# Patient Record
Sex: MALE
[De-identification: names, ages, dates, MRNs, and addresses within clinical notes are randomized per-mention and may not be internally consistent; named-entity substitution may affect disease eponyms.]

---

## 2017-03-15 ENCOUNTER — HOSPITAL ENCOUNTER (OUTPATIENT)
Dept: HOSPITAL 42 - ENDO | Age: 77
Discharge: HOME | End: 2017-03-15
Attending: SPECIALIST
Payer: MEDICARE

## 2017-03-15 VITALS
HEART RATE: 55 BPM | OXYGEN SATURATION: 95 % | RESPIRATION RATE: 21 BRPM | DIASTOLIC BLOOD PRESSURE: 63 MMHG | TEMPERATURE: 97.5 F | SYSTOLIC BLOOD PRESSURE: 131 MMHG

## 2017-03-15 VITALS — BODY MASS INDEX: 29.6 KG/M2

## 2017-03-15 DIAGNOSIS — K44.9: ICD-10-CM

## 2017-03-15 DIAGNOSIS — K21.0: Primary | ICD-10-CM

## 2017-03-15 DIAGNOSIS — K31.7: ICD-10-CM

## 2017-03-15 DIAGNOSIS — K64.8: ICD-10-CM

## 2017-03-15 DIAGNOSIS — K63.5: ICD-10-CM

## 2017-03-15 DIAGNOSIS — K57.30: ICD-10-CM

## 2017-03-15 DIAGNOSIS — K29.50: ICD-10-CM

## 2017-03-15 DIAGNOSIS — E78.5: ICD-10-CM

## 2017-03-15 DIAGNOSIS — Z12.11: ICD-10-CM

## 2017-03-15 PROCEDURE — 45380 COLONOSCOPY AND BIOPSY: CPT

## 2017-03-15 PROCEDURE — 43239 EGD BIOPSY SINGLE/MULTIPLE: CPT

## 2017-03-15 PROCEDURE — 88312 SPECIAL STAINS GROUP 1: CPT

## 2017-03-15 PROCEDURE — 88305 TISSUE EXAM BY PATHOLOGIST: CPT

## 2017-03-15 PROCEDURE — 88342 IMHCHEM/IMCYTCHM 1ST ANTB: CPT

## 2018-09-19 ENCOUNTER — HOSPITAL ENCOUNTER (INPATIENT)
Dept: HOSPITAL 42 - ED | Age: 78
LOS: 8 days | Discharge: HOME | DRG: 418 | End: 2018-09-27
Attending: FAMILY MEDICINE | Admitting: INTERNAL MEDICINE
Payer: MEDICARE

## 2018-09-19 VITALS — BODY MASS INDEX: 29.6 KG/M2

## 2018-09-19 DIAGNOSIS — R78.81: ICD-10-CM

## 2018-09-19 DIAGNOSIS — E78.00: ICD-10-CM

## 2018-09-19 DIAGNOSIS — B95.4: ICD-10-CM

## 2018-09-19 DIAGNOSIS — K22.70: ICD-10-CM

## 2018-09-19 DIAGNOSIS — I35.9: ICD-10-CM

## 2018-09-19 DIAGNOSIS — I25.10: ICD-10-CM

## 2018-09-19 DIAGNOSIS — K66.0: ICD-10-CM

## 2018-09-19 DIAGNOSIS — K80.10: ICD-10-CM

## 2018-09-19 DIAGNOSIS — Z79.82: ICD-10-CM

## 2018-09-19 DIAGNOSIS — Z79.899: ICD-10-CM

## 2018-09-19 DIAGNOSIS — K40.90: ICD-10-CM

## 2018-09-19 DIAGNOSIS — I10: ICD-10-CM

## 2018-09-19 DIAGNOSIS — E78.5: ICD-10-CM

## 2018-09-19 DIAGNOSIS — K21.9: ICD-10-CM

## 2018-09-19 DIAGNOSIS — K80.62: Primary | ICD-10-CM

## 2018-09-19 DIAGNOSIS — Z95.3: ICD-10-CM

## 2018-09-19 DIAGNOSIS — Z87.891: ICD-10-CM

## 2018-09-19 LAB
ALBUMIN SERPL-MCNC: 4.3 G/DL (ref 3–4.8)
ALBUMIN/GLOB SERPL: 1.2 {RATIO} (ref 1.1–1.8)
ALT SERPL-CCNC: 499 U/L (ref 7–56)
APTT BLD: 31.2 SECONDS (ref 25.1–36.5)
AST SERPL-CCNC: 691 U/L (ref 17–59)
BUN SERPL-MCNC: 20 MG/DL (ref 7–21)
CALCIUM SERPL-MCNC: 9.6 MG/DL (ref 8.4–10.5)
ERYTHROCYTE [DISTWIDTH] IN BLOOD BY AUTOMATED COUNT: 13.6 % (ref 11.5–14.5)
GFR NON-AFRICAN AMERICAN: > 60
HGB BLD-MCNC: 13.5 G/DL (ref 14–18)
INR PPP: 1.09
LIPASE SERPL-CCNC: 159 U/L (ref 23–300)
MCH RBC QN AUTO: 28.8 PG (ref 25–35)
MCHC RBC AUTO-ENTMCNC: 33.4 G/DL (ref 31–37)
MCV RBC AUTO: 86.1 FL (ref 80–105)
PLATELET # BLD: 144 10^3/UL (ref 120–450)
PMV BLD AUTO: 11 FL (ref 7–11)
PROTHROMBIN TIME: 12.4 SECONDS (ref 9.4–12.5)
RBC # BLD AUTO: 4.69 10^6/UL (ref 3.5–6.1)
TROPONIN I SERPL-MCNC: < 0.01 NG/ML
WBC # BLD AUTO: 6.5 10^3/UL (ref 4.5–11)

## 2018-09-19 NOTE — ED PDOC
Arrival/HPI





- General


Chief Complaint: Abdominal Pain


Time Seen by Provider: 09/19/18 21:23


Historian: Patient





- History of Present Illness


Narrative History of Present Illness (Text): 


09/19/18 21:41


Yaniv Goldman is a 78 year old male, whose past medical history includes 

hyperlipidemia, GERD, status post valve replacement, who presents to the 

Emergency department complaining of abdominal pain. Patient reports he has been 

experiencing diffuse abdominal pain for the past week, notes pain began after 

taking his cholesterol medication. Patient denies any changes in medication or 

changes in dosage. Patient notes pain is worsened after eating and reports 

associated nausea. Patient denies any fever, chills, chest pain, shortness of 

breath, vomiting, diarrhea, urinary symptoms, back pain, neck pain, headache, 

dizziness, or any other complaints. 





PMD: Dr. Quick





Past Medical History





- Provider Review


Nursing Documentation Reviewed: Yes





- Infectious Disease


Hx of Infectious Diseases: None





- Tetanus Immunization


Tetanus Immunization: Unknown





- Cardiac


Hx Hypertension: Yes





- Neurological


Hx Paralysis: No





- Hematological/Oncological


Hx Blood Transfusions: No





- Musculoskeletal/Rheumatological


Hx Musculoskeletal Disorders: No





- Gastrointestinal


Hx Gastroesophageal Reflux: Yes





- Psychiatric


Hx Emotional Abuse: No


Hx Physical Abuse: No


Hx Substance Use: No





- Surgical History


Hx Valve Replacement: Yes





- Anesthesia


Hx Anesthesia Reactions: No


Hx Malignant Hyperthermia: No





- Suicidal Assessment


Feels Threatened In Home Enviroment: No





Family/Social History





- Physician Review


Nursing Documentation Reviewed: Yes


Family/Social History: Unknown Family HX


Smoking Status: Never Smoked


Hx Alcohol Use: Yes (OCCASIONAL)


Hx Substance Use: No


Hx Substance Use Treatment: No





Allergies/Home Meds


Allergies/Adverse Reactions: 


Allergies





No Known Allergies Allergy (Verified 07/18/15 21:22)


 








Home Medications: 


 Home Meds











 Medication  Instructions  Recorded  Confirmed


 


Fenofibrate Nanocrystallized 48 mg PO DAILY 04/21/16 04/23/18





[Fenofibrate]   


 


Pravastatin Sodium [Pravachol] 40 mg PO DAILY 04/21/16 04/23/18


 


Aspirin [Ecotrin] 81 mg PO DAILY 03/07/17 04/23/18


 


Omeprazole 40 mg PO DAILY 03/15/17 04/23/18














Physical Exam


Vital Signs Reviewed: Yes


Vital Signs











  Temp Pulse Resp BP Pulse Ox


 


 09/20/18 03:00  98.2 F  63  20  175/90 H  95


 


 09/20/18 00:44  98.2 F  67  20  128/67  97


 


 09/19/18 21:29  98.4 F  55 L  18  171/91 H  97











Temperature: Afebrile


Blood Pressure: Hypertensive


Pulse: Regular


Respiratory Rate: Normal


Appearance: Positive for: Well-Appearing, Non-Toxic, Comfortable


Pain Distress: None


Mental Status: Positive for: Alert and Oriented X 3





- Systems Exam


Head: Present: Atraumatic, Normocephalic


Pupils: Present: PERRL


Extroacular Muscles: Present: EOMI


Conjunctiva: Present: Normal


Mouth: Present: Moist Mucous Membranes


Neck: Present: Normal Range of Motion


Respiratory/Chest: Present: Clear to Auscultation, Good Air Exchange.  No: 

Respiratory Distress, Accessory Muscle Use


Cardiovascular: Present: Regular Rate and Rhythm, Normal S1, S2.  No: Murmurs


Abdomen: Present: Tenderness (right upper abdomen), Distention (Mild abdominal 

distension), Normal Bowel Sounds.  No: Peritoneal Signs, Rebound, Guarding, 

Mc[place]'s Point Tender


Back: Present: Normal Inspection


Upper Extremity: Present: Normal Inspection.  No: Cyanosis, Edema


Lower Extremity: Present: Normal Inspection.  No: Edema


Neurological: Present: GCS=15, CN II-XII Intact, Speech Normal


Skin: Present: Warm, Dry, Normal Color.  No: Rashes


Psychiatric: Present: Alert, Oriented x 3, Normal Insight, Normal Concentration





Medical Decision Making


ED Course and Treatment: 


09/19/18 21:41


Impression: 


78 year old male c/o diffuse abdominal pain and nausea x 1 week.





Plan: 


-- CT Abdomen and Pelvis


-- EKG


-- Chest X-Ray


-- Labs, lipase


-- UA


-- reassess and disposition





Prior Visits: 


Notes and results from pervious visits were reviewed. 








Progress Notes: 


Reviewed EKG, NSR at 60 bpm. 1st degree AV block. No acute changes.





09/19/18 23:43


Chest X-Ray reviewed, shows no acute processes.


Labs noted, AST: 691, ALT: 499, bilirubin: 2.1. US Gallbladder and Pancreas 

ordered. 


RN reports patient is complaining of abdominal pain, Morphine, Zofran, and IV 

fluids ordered.





09/20/18 01:31


Preliminary US read shows multiple gallstones and thickened gallbladder wall. 

Rocephin and Flagyl ordered.





09/20/18 01:42


Case discussed with medical resident on call, who is aware and agrees with plan.





09/20/18 01:44


Case discussed with Dr. ANETA Brothers, who is aware and agrees with plan. Accepts pt 

in to hospitalist service. Pt will be admitted to [facility] for cholecystitis. 





09/20/18 02:40


CT Abdomen and Pelvis shows:


Lower thorax: Right pleural plaques.


ABDOMEN:


Liver: Heterogenous in appearance.


Gallbladder and bile ducts: Mild intrahepatic biliary ductal dilatation. 

Gallstones.


Pancreas: Pancreas evaluation is limited.


Spleen: Normal. No splenomegaly.


Adrenals: thickening of the left adrenal gland.


Kidneys and ureters: Subcentimeter low-density lesions in the kidneys are too 

small .


Stomach and bowel: Bowel evaluation is limited. There is wall thickening noted 

involving the


sigmoid colon with slight infiltration of fat. Question early diverticulitis. 

Consider followup to evaluate


the underlying wall. The wall thickening noted involving the descending colon 

as well.


Underdistended.


Appendix: Right inguinal hernia containing part of the appendix.


PELVIS:


Bladder: Unremarkable as visualized.


Reproductive: The prostate is enlarged.


ABDOMEN and PELVIS:


Intraperitoneal space: Normal. No free air. No significant fluid collection.


Bones/joints: No acute fracture. No dislocation.


Soft tissues: Bilateral small inguinal hernia containing fat.


Vasculature: Arthrosclerotic changes of the aorta. Atherosclerotic changes of 

the aorta. Infrarenal


dilatation of the abdominal aorta.


Lymph nodes: Normal. No enlarged lymph nodes.


IMPRESSION:


1. Mild intrahepatic biliary ductal dilatation.


2. Gallstones.


3. Right inguinal hernia containing part of the appendix.


4. There is wall thickening noted involving the sigmoid colon with slight 

infiltration of fat. Question


early diverticulitis. Consider followup to evaluate the underlying wall.


Dictated and Authenticated by: Amira Menard MD


09/20/2018 2:01 AM Eastern Time ([place] & [place])





- Lab Interpretations


Lab Results: 








 09/19/18 22:00 





 09/19/18 22:00 





 Lab Results





09/19/18 22:00: PT 12.4, INR 1.09, APTT 31.2


09/19/18 22:00: WBC 6.5, RBC 4.69, Hgb 13.5 L, Hct 40.4 L, MCV 86.1, MCH 28.8, 

MCHC 33.4, RDW 13.6, Plt Count 144, MPV 11.0


09/19/18 22:00: Sodium 140, Potassium 4.6, Chloride 102, Carbon Dioxide 30, 

Anion Gap 13, BUN 20, Creatinine 0.8, Est GFR (African Amer) > 60, Est GFR (Non-

Af Amer) > 60, Random Glucose 167 H, Calcium 9.6, Total Bilirubin 2.1 H, AST 

691 H D,  H, Alkaline Phosphatase 340 H D, Lactate Dehydrogenase 1652 H, 

Total Creatine Kinase 55, Troponin I < 0.01  D, Total Protein 7.8, Albumin 4.3, 

Globulin 3.5, Albumin/Globulin Ratio 1.2, Lipase 159








I have reviewed the lab results: Yes





- RAD Interpretation


Radiology Orders: 








09/19/18


GALLBLADDER & PANCREAS [US] Stat 





09/19/18 21:41


CHEST PORTABLE [RAD] Stat 





09/19/18 21:42


ABD & PELVIS IV CONTRAST ONLY [CT] Stat 











: ED Physician, Radiologist





- EKG Interpretation


Interpreted by ED Physician: Yes


Type: 12 lead EKG





- Medication Orders


Current Medication Orders: 








Metronidazole (Flagyl)  500 mg in 100 mls @ 100 mls/hr IVPB Q8 JOSE CARLOS


   PRN Reason: Protocol


Ceftriaxone Sodium (Rocephin 1 Gram Ivpb)  1 gm in 100 mls @ 100 mls/hr IVPB 

DAILY JOSE CARLOS


   PRN Reason: Protocol


Sodium Chloride (Sodium Chloride 0.9%)  1,000 mls @ 75 mls/hr IV .[id number] JOSE CARLOS


   Last Admin: 09/20/18 04:26  Dose: 75 mls/hr





eMAR Start Stop


 Document     09/20/18 04:26  BR  (Rec: 09/20/18 04:26  BR  [id number])


     Intravenous Solution


      Start Date                                 09/20/18


      Start Time                                 04:26





Ondansetron HCl (Zofran Inj)  4 mg IVP Q6H PRN


   PRN Reason: Nausea/Vomiting


Pantoprazole Sodium (Protonix Inj)  40 mg IVP DAILY JOSE CARLOS





Discontinued Medications





Sodium Chloride (Sodium Chloride 0.9%)  500 mls @ 500 mls/hr IV .Q1H STA


   Stop: 09/20/18 00:40


   Last Admin: 09/20/18 00:00  Dose: 500 mls/hr





eMAR Start Stop


 Document     09/20/18 00:00  OCS  (Rec: 09/20/18 00:00  OCS  [id number])


     Intravenous Solution


      Start Date                                 09/20/18


      Start Time                                 00:00


      End Date                                   09/20/18


      End time                                   01:00


      Total Infusion Time                        60





Ceftriaxone Sodium (Rocephin 1 Gram Ivpb)  1 gm in 100 mls @ 200 mls/hr IV ONCE 

STA


   PRN Reason: Protocol


   Stop: 09/20/18 02:05


   Last Admin: 09/20/18 02:11  Dose: 200 mls/hr





eMAR Start Stop


 Document     09/20/18 02:11  OCS  (Rec: 09/20/18 02:12  OCS  [id number])


     Intravenous Solution


      Start Date                                 09/20/18


      Start Time                                 02:12


      End Date                                   09/20/18


      End time                                   02:42


      Total Infusion Time                        30





Metronidazole (Flagyl)  500 mg in 100 mls @ 100 mls/hr IVPB STAT STA


   PRN Reason: Protocol


   Stop: 09/20/18 02:35


   Last Admin: 09/20/18 03:27  Dose: 100 mls/hr





eMAR Start Stop


 Document     09/20/18 03:27  OCS  (Rec: 09/20/18 03:27  OCS  [id number])


     Intravenous Solution


      Start Date                                 09/20/18


      Start Time                                 03:27





Morphine Sulfate (Morphine)  2 mg IVP STAT STA


   Stop: 09/19/18 23:42


   Last Admin: 09/19/18 23:59  Dose: 2 mg





MAR Pain Assessment


 Document     09/19/18 23:59  OCS  (Rec: 09/19/18 23:59  OCS  [id number])


     Pain Reassessment


      Is this a pain reassessment?               No


     Sleep


      Is patient sleeping during reassessment?   No


     Presence of Pain


      Presence of Pain                           Yes


     Pain Scale Used


      Pain Scale Used                            Numeric


     Location


      Upper or Lower                             Upper


      Pain Location Body Site                    Abdomen


     Description


      Description                                Constant


      Intensity of Pain at present               10


      Pain Behavior                              Guarding


                                                 Irritability


                                                 Facial Grimacing


      Aggravating Factors                        ADL's


IVP Administration


 Document     09/19/18 23:59  OCS  (Rec: 09/19/18 23:59  OCS  [id number])


     Charges for Administration


      # of IVP Administrations                   1





Morphine Sulfate (Morphine)  1 mg IVP STAT STA


   Stop: 09/20/18 03:57


   Last Admin: 09/20/18 04:23  Dose: 1 mg





MAR Pain Assessment


 Document     09/20/18 04:23  BR  (Rec: 09/20/18 04:24  BR  [id number])


     Pain Reassessment


      Is this a pain reassessment?               No


     Sleep


      Is patient sleeping during reassessment?   No


     Presence of Pain


      Presence of Pain                           Yes


IVP Administration


 Document     09/20/18 04:23  BR  (Rec: 09/20/18 04:24  BR  [id number])


     Charges for Administration


      # of IVP Administrations                   1





Ondansetron HCl (Zofran Inj)  4 mg IVP ONCE ONE


   Stop: 09/19/18 23:42


   Last Admin: 09/19/18 23:59  Dose: 4 mg





IVP Administration


 Document     09/19/18 23:59  OCS  (Rec: 09/19/18 23:59  OCS  [id number])


     Charges for Administration


      # of IVP Administrations                   1














- PA / NP / Resident Statement


MD/DO has reviewed & agrees with the documentation as recorded.


MD/DO has examined the patient and agrees with the treatment plan.





- Scribe Statement


The provider has reviewed the documentation as recorded by the Scribesther Lepe





All medical record entries made by the Cyrusibesther were at my direction and 

personally dictated by me. I have reviewed the chart and agree that the record 

accurately reflects my personal performance of the history, physical exam, 

medical decision making, and the department course for this patient. I have 

also personally directed, reviewed, and agree with the discharge instructions 

and disposition.








Disposition/Present on Arrival





- Present on Arrival


Any Indicators Present on Arrival: No


History of DVT/PE: No


History of Uncontrolled Diabetes: No


Urinary Catheter: No


History of Decub. Ulcer: No


History Surgical Site Infection Following: None





- Disposition


Have Diagnosis and Disposition been Completed?: Yes


Diagnosis: 


 Biliary colic, Cholecystitis, Elevated transaminase level





Disposition: HOSPITALIZED


Disposition Time: 01:44


Patient Plan: Admission


Patient Problems: 


 Current Active Problems











Problem Status Onset


 


Biliary colic Acute  


 


Cholecystitis Acute  


 


Elevated transaminase level Acute  











Condition: STABLE

## 2018-09-20 LAB
ALBUMIN SERPL-MCNC: 3.9 G/DL (ref 3–4.8)
ALBUMIN/GLOB SERPL: 1.2 {RATIO} (ref 1.1–1.8)
ALT SERPL-CCNC: 690 U/L (ref 7–56)
APPEARANCE UR: (no result)
AST SERPL-CCNC: 750 U/L (ref 17–59)
BASOPHILS # BLD AUTO: 0.01 K/MM3 (ref 0–2)
BASOPHILS NFR BLD: 0.1 % (ref 0–3)
BILIRUB DIRECT SERPL-MCNC: 2.4 MG/DL (ref 0–0.4)
BILIRUB UR-MCNC: (no result) MG/DL
BUN SERPL-MCNC: 20 MG/DL (ref 7–21)
CALCIUM SERPL-MCNC: 8.8 MG/DL (ref 8.4–10.5)
COLOR UR: YELLOW
EOSINOPHIL # BLD: 0 10*3/UL (ref 0–0.7)
EOSINOPHIL NFR BLD: 0 % (ref 1.5–5)
EPI CELLS #/AREA URNS HPF: (no result) /HPF (ref 0–5)
ERYTHROCYTE [DISTWIDTH] IN BLOOD BY AUTOMATED COUNT: 13.9 % (ref 11.5–14.5)
GAMMA GLUTAMYL TRANSPEPTIDASE: 1146 U/L (ref 8–78)
GFR NON-AFRICAN AMERICAN: > 60
GLUCOSE UR STRIP-MCNC: NEGATIVE MG/DL
GRANULOCYTES # BLD: 8.21 10*3/UL (ref 1.4–6.5)
GRANULOCYTES NFR BLD: 85.8 % (ref 50–68)
HEPATITIS A IGM: NEGATIVE
HEPATITIS B CORE AB: NEGATIVE
HEPATITIS C ANTIBODY: NEGATIVE
HGB BLD-MCNC: 12.6 G/DL (ref 14–18)
LEUKOCYTE ESTERASE UR-ACNC: NEGATIVE LEU/UL
LYMPHOCYTES # BLD: 0.8 10*3/UL (ref 1.2–3.4)
LYMPHOCYTES NFR BLD AUTO: 8.8 % (ref 22–35)
MCH RBC QN AUTO: 28.1 PG (ref 25–35)
MCHC RBC AUTO-ENTMCNC: 32.6 G/DL (ref 31–37)
MCV RBC AUTO: 86.4 FL (ref 80–105)
MONOCYTES # BLD AUTO: 0.5 10*3/UL (ref 0.1–0.6)
MONOCYTES NFR BLD: 5.3 % (ref 1–6)
PH UR STRIP: 7.5 [PH] (ref 4.7–8)
PLATELET # BLD: 136 10^3/UL (ref 120–450)
PMV BLD AUTO: 11.2 FL (ref 7–11)
PROT UR STRIP-MCNC: (no result) MG/DL
RBC # BLD AUTO: 4.48 10^6/UL (ref 3.5–6.1)
RBC # UR STRIP: (no result) /UL
RBC #/AREA URNS HPF: (no result) /HPF (ref 0–2)
SP GR UR STRIP: 1.01 (ref 1–1.03)
UROBILINOGEN UR STRIP-ACNC: 1 E.U./DL
WBC # BLD AUTO: 9.6 10^3/UL (ref 4.5–11)
WBC #/AREA URNS HPF: (no result) /HPF (ref 0–6)

## 2018-09-20 RX ADMIN — PIPERACILLIN AND TAZOBACTAM SCH MLS/HR: 3; .375 INJECTION, POWDER, LYOPHILIZED, FOR SOLUTION INTRAVENOUS; PARENTERAL at 17:38

## 2018-09-20 RX ADMIN — PIPERACILLIN AND TAZOBACTAM SCH MLS/HR: 3; .375 INJECTION, POWDER, LYOPHILIZED, FOR SOLUTION INTRAVENOUS; PARENTERAL at 23:37

## 2018-09-20 RX ADMIN — PIPERACILLIN AND TAZOBACTAM SCH MLS/HR: 3; .375 INJECTION, POWDER, LYOPHILIZED, FOR SOLUTION INTRAVENOUS; PARENTERAL at 11:09

## 2018-09-20 NOTE — CP.PCM.CON
<Tramaine Wu - Last Filed: 09/20/18 12:29>





History of Present Illness





- History of Present Illness


History of Present Illness: 


GI Consult Note for Dr. Venegas's Service - [place] PGY2





Reason for Consult: Evaluation for EUS/ERCP





Mr. Goldman is a 78 year old male with a past medical history significant for 

GERD, HTN, HLD, AS s/p bioprosthetic AVR, inguinal hernia s/p LIHR who 

presented with RUQ pain for one week. GI was consulted to perform EUS/ERCP. 

Patient reports that he began to have the pain after taking his cholesterol 

medication. This pain was noted to be intermittent and would resolve throughout 

the course of the day but would return when that patient would take his 

medications. Patient was hoping this would resolve on its own but yesterday the 

pain persisted beyond its normal short self resolving episodes so he decided to 

come in for further evaluation. He describes the pain as sharp, non-radiating 

and a 10/10 prior to presentation (currently 5/10). Currently, he denies illness

, recent travel, sick contacts, weight loss, night sweats, fevers, chills, 

headache, lightheadedness, changes in vision, chest pain, SOB, abdominal pain, N

/V/D/C, melena, hematemesis, changes in urine output, skin changes or any 

numbness/tingling/weakness of any extremity.





PMH: GERD, HTN, HLD, AS s/p bioprosthetic AVR, inguinal hernia s/p LIHR


PSH:  Bioprosthetic AVR (2009), LIHR (~1980)


Family History: Reviewed and non-contributory


Social History: Former smoker; Denies any alcohol or illicit drug use


Allergies: NKDA


Home Medications: As per MAR





PMD: Dr. Quick





Review of Systems





- Review of Systems


Review of Systems: 


As stated in HPI, otherwise negative





Past Patient History





- Infectious Disease


Hx of Infectious Diseases: None





- Tetanus Immunizations


Tetanus Immunization: Unknown





- Past Social History


Smoking Status: Never Smoked





- CARDIAC


Hx Heart Murmur: No


Hx Hypercholesterolemia: Yes


Hx Hypertension: Yes


Hx Pacemaker: No


Other/Comment: Valve replacement (2005)





- PULMONARY


Hx Respiratory Disorders: No





- NEUROLOGICAL


Hx Neurological Disorder: No





- HEENT


Hx HEENT Problems: No





- RENAL


Hx Chronic Kidney Disease: No





- ENDOCRINE/METABOLIC


Hx Endocrine Disorders: No





- HEMATOLOGICAL/ONCOLOGICAL


Hx Blood Disorders: No





- INTEGUMENTARY


Hx Dermatological Problems: No





- MUSCULOSKELETAL/RHEUMATOLOGICAL


Hx Falls: No





- GASTROINTESTINAL


Hx Gastroesophageal Reflux: Yes





- GENITOURINARY/GYNECOLOGICAL


Hx Genitourinary Disorders: No





- PSYCHIATRIC


Hx Emotional Abuse: No


Hx Physical Abuse: No


Hx Substance Use: No





- SURGICAL HISTORY


Hx Valve Replacement: Yes





- ANESTHESIA


Hx Anesthesia Reactions: No


Hx Malignant Hyperthermia: No





Meds


Allergies/Adverse Reactions: 


 Allergies











Allergy/AdvReac Type Severity Reaction Status Date / Time


 


No Known Allergies Allergy   Verified 07/18/15 21:22














- Medications


Medications: 


 Current Medications





Atorvastatin Calcium (Lipitor)  20 mg PO DIN JOSE CARLOS


Sodium Chloride (Sodium Chloride 0.9%)  1,000 mls @ 75 mls/hr IV .[id number] [facility]


   Last Admin: 09/20/18 04:26 Dose:  75 mls/hr


Piperacillin Sod/Tazobactam Sod (Zosyn 3.375 In Ns 100ml)  100 mls @ 200 mls/hr 

IVPB Q6 JOSE CARLOS


   PRN Reason: Protocol


   Stop: 09/27/18 12:01


   Last Admin: 09/20/18 11:09 Dose:  200 mls/hr


Ondansetron HCl (Zofran Inj)  4 mg IVP Q6H PRN


   PRN Reason: Nausea/Vomiting


   Last Admin: 09/20/18 07:22 Dose:  4 mg


Pantoprazole Sodium (Protonix Inj)  40 mg IVP DAILY [facility]


   Last Admin: 09/20/18 10:06 Dose:  40 mg











Physical Exam





- Constitutional


Appears: Non-toxic, In Acute Distress





- Head Exam


Head Exam: ATRAUMATIC, NORMOCEPHALIC





- Eye Exam


Eye Exam: EOMI





- Neck Exam


Neck exam: Positive for: Full Rom





- Respiratory Exam


Respiratory Exam: NORMAL BREATHING PATTERN.  absent: Accessory Muscle Use, 

Respiratory Distress





- Cardiovascular Exam


Cardiovascular Exam: +S1, +S2





- GI/Abdominal Exam


GI & Abdominal Exam: Normal Bowel Sounds, Soft, Tenderness (RUQ TTP; Mild).  

absent: Bruit, Diminished Bowel Sounds, Distended, Firm, Guarding, Hernia, 

Hyperactive Bowel Sounds, Hypoactive Bowel Sounds, Mass, Organomegaly, 

Pulsatile Mass, Rebound, Rigid





- Rectal Exam


Rectal Exam: Deferred





- Neurological Exam


Neurological exam: Alert, Oriented x3





- Psychiatric Exam


Psychiatric exam: Normal Affect, Normal Mood





- Skin


Skin Exam: Dry, Intact, Normal Color, Warm





Results





- Vital Signs


Recent Vital Signs: 


 Last Vital Signs











Temp  98.8 F   09/20/18 07:30


 


Pulse  74   09/20/18 07:30


 


Resp  20   09/20/18 08:24


 


BP  152/81 H  09/20/18 07:30


 


Pulse Ox  95   09/20/18 07:30














- Labs


Result Diagrams: 


 09/20/18 07:00





 09/20/18 07:00


Labs: 


 Laboratory Results - last 24 hr











  09/20/18 09/20/18 09/20/18





  03:00 07:00 07:00


 


WBC   9.6  D 


 


RBC   4.48 


 


Hgb   12.6 L 


 


Hct   38.7 L 


 


MCV   86.4 


 


MCH   28.1 


 


MCHC   32.6 


 


RDW   13.9 


 


Plt Count   136 


 


MPV   11.2 H 


 


Gran %   85.8 H 


 


Lymph % (Auto)   8.8 L 


 


Mono % (Auto)   5.3 


 


Eos % (Auto)   0.0 L 


 


Baso % (Auto)   0.1 


 


Gran #   8.21 H 


 


Lymph # (Auto)   0.8 L 


 


Mono # (Auto)   0.5 


 


Eos # (Auto)   0.0 


 


Baso # (Auto)   0.01 


 


Sodium    140


 


Potassium    4.1


 


Chloride    104


 


Carbon Dioxide    29


 


Anion Gap    11


 


BUN    20


 


Creatinine    0.7 L


 


Est GFR ( Amer)    > 60


 


Est GFR (Non-Af Amer)    > 60


 


Random Glucose    117 H


 


Calcium    8.8


 


Total Bilirubin    3.2 H


 


Direct Bilirubin    2.4 H


 


GGT    1146 H


 


AST    750 H


 


ALT    690 H


 


Alkaline Phosphatase    350 H


 


Total Protein    7.2


 


Albumin    3.9


 


Globulin    3.3


 


Albumin/Globulin Ratio    1.2


 


Urine Color  Yellow  


 


Urine Appearance  Cloudy  


 


Urine pH  7.5  


 


Ur Specific Gravity  1.010  


 


Urine Protein  Trace H  


 


Urine Glucose (UA)  Negative  


 


Urine Ketones  Negative  


 


Urine Blood  Trace-intact H  


 


Urine Nitrate  Negative  


 


Urine Bilirubin  Small H  


 


Urine Urobilinogen  1.0 H  


 


Ur Leukocyte Esterase  Negative  


 


Urine RBC  0 - 2  


 


Urine WBC  0 - 2  


 


Ur Epithelial Cells  0 - 2  


 


Blood Type   


 


Blood Type Confirm   


 


Antibody Screen   


 


BBK History Checked   














  09/20/18 09/20/18





  07:00 08:30


 


WBC  


 


RBC  


 


Hgb  


 


Hct  


 


MCV  


 


MCH  


 


MCHC  


 


RDW  


 


Plt Count  


 


MPV  


 


Gran %  


 


Lymph % (Auto)  


 


Mono % (Auto)  


 


Eos % (Auto)  


 


Baso % (Auto)  


 


Gran #  


 


Lymph # (Auto)  


 


Mono # (Auto)  


 


Eos # (Auto)  


 


Baso # (Auto)  


 


Sodium  


 


Potassium  


 


Chloride  


 


Carbon Dioxide  


 


Anion Gap  


 


BUN  


 


Creatinine  


 


Est GFR ( Amer)  


 


Est GFR (Non-Af Amer)  


 


Random Glucose  


 


Calcium  


 


Total Bilirubin  


 


Direct Bilirubin  


 


GGT  


 


AST  


 


ALT  


 


Alkaline Phosphatase  


 


Total Protein  


 


Albumin  


 


Globulin  


 


Albumin/Globulin Ratio  


 


Urine Color  


 


Urine Appearance  


 


Urine pH  


 


Ur Specific Gravity  


 


Urine Protein  


 


Urine Glucose (UA)  


 


Urine Ketones  


 


Urine Blood  


 


Urine Nitrate  


 


Urine Bilirubin  


 


Urine Urobilinogen  


 


Ur Leukocyte Esterase  


 


Urine RBC  


 


Urine WBC  


 


Ur Epithelial Cells  


 


Blood Type  O POSITIVE 


 


Blood Type Confirm   O POSITIVE


 


Antibody Screen  Negative 


 


BBK History Checked  No verified bt 














Assessment & Plan





- Assessment and Plan (Free Text)


Assessment: 


78 year old male with a past medical history significant for GERD, HTN, HLD, AS 

s/p bioprosthetic AVR, inguinal hernia s/p LIHR who presented with RUQ pain for 

one week. GI was consulted to perform EUS/ERCP.


Plan: 


-MRCP showed 3mm distal CBD stone with the CBD measuring 6mm as well as 

multiple large gallstones


-Abdominal US showed hepatomegaly with fatty infiltration, intrahepatic ductal 

dilatation, multiple gallstones, distended GB (5mm wall thickness) with sludge, 

and CBD measuring 7mm with no stone


-CT Abdomen/Pelvis showed multiple gallstones and mild intrahepatic ductal 

dilatation


-Biliary/LFT lab abnormalities noted and reviewed


-Continue IV Zosyn


-Continue NS at 75mls/hr


-Continue Zofran PRN for N/V


-NPO Except Medications Diet


-Continue IVP PPI daily





GI Disposition: Patient will go for EGD/EUS with possible ERCP tomorrow (9/21) 

at approximately 1415.





Patient seen and case discussed with attending, Dr. Venegas.





- Date & Time


Date: 09/20/18


Time: 12:17





<Mason Venegas V - Last Filed: 09/20/18 23:16>





Meds





- Medications


Medications: 


 Current Medications





Atorvastatin Calcium (Lipitor)  20 mg PO DIN [facility]


   Last Admin: 09/20/18 17:38 Dose:  Not Given


Sodium Chloride (Sodium Chloride 0.9%)  1,000 mls @ 75 mls/hr IV .[id number] [facility]


   Last Admin: 09/20/18 04:26 Dose:  75 mls/hr


Piperacillin Sod/Tazobactam Sod (Zosyn 3.375 In Ns 100ml)  100 mls @ 200 mls/hr 

IVPB Q6 JOSE CARLOS


   PRN Reason: Protocol


   Stop: 09/27/18 12:01


   Last Admin: 09/20/18 17:38 Dose:  200 mls/hr


Ondansetron HCl (Zofran Inj)  4 mg IVP Q6H PRN


   PRN Reason: Nausea/Vomiting


   Last Admin: 09/20/18 07:22 Dose:  4 mg


Pantoprazole Sodium (Protonix Inj)  40 mg IVP DAILY [facility]


   Last Admin: 09/20/18 10:06 Dose:  40 mg











Results





- Vital Signs


Recent Vital Signs: 


 Last Vital Signs











Temp  97.6 F   09/20/18 14:00


 


Pulse  60   09/20/18 14:00


 


Resp  20   09/20/18 14:00


 


BP  128/63   09/20/18 14:00


 


Pulse Ox  96   09/20/18 14:00














- Labs


Result Diagrams: 


 09/20/18 07:00





 09/20/18 07:00


Labs: 


 Laboratory Results - last 24 hr











  09/20/18 09/20/18 09/20/18





  03:00 07:00 07:00


 


WBC    9.6  D


 


RBC    4.48


 


Hgb    12.6 L


 


Hct    38.7 L


 


MCV    86.4


 


MCH    28.1


 


MCHC    32.6


 


RDW    13.9


 


Plt Count    136


 


MPV    11.2 H


 


Gran %    85.8 H


 


Lymph % (Auto)    8.8 L


 


Mono % (Auto)    5.3


 


Eos % (Auto)    0.0 L


 


Baso % (Auto)    0.1


 


Gran #    8.21 H


 


Lymph # (Auto)    0.8 L


 


Mono # (Auto)    0.5


 


Eos # (Auto)    0.0


 


Baso # (Auto)    0.01


 


Sodium   


 


Potassium   


 


Chloride   


 


Carbon Dioxide   


 


Anion Gap   


 


BUN   


 


Creatinine   


 


Est GFR ( Amer)   


 


Est GFR (Non-Af Amer)   


 


Random Glucose   


 


Calcium   


 


Total Bilirubin   


 


Direct Bilirubin   


 


GGT   


 


AST   


 


ALT   


 


Alkaline Phosphatase   


 


Total Protein   


 


Albumin   


 


Globulin   


 


Albumin/Globulin Ratio   


 


Urine Color  Yellow  


 


Urine Appearance  Cloudy  


 


Urine pH  7.5  


 


Ur Specific Gravity  1.010  


 


Urine Protein  Trace H  


 


Urine Glucose (UA)  Negative  


 


Urine Ketones  Negative  


 


Urine Blood  Trace-intact H  


 


Urine Nitrate  Negative  


 


Urine Bilirubin  Small H  


 


Urine Urobilinogen  1.0 H  


 


Ur Leukocyte Esterase  Negative  


 


Urine RBC  0 - 2  


 


Urine WBC  0 - 2  


 


Ur Epithelial Cells  0 - 2  


 


Hepatitis A IgM Ab   Negative 


 


Hep Bs Antigen   Negative 


 


Hep B Core IgM Ab   Negative 


 


Hepatitis C Antibody   Negative 


 


Blood Type   


 


Blood Type Confirm   


 


Antibody Screen   


 


BBK History Checked   














  09/20/18 09/20/18 09/20/18





  07:00 07:00 08:30


 


WBC   


 


RBC   


 


Hgb   


 


Hct   


 


MCV   


 


MCH   


 


MCHC   


 


RDW   


 


Plt Count   


 


MPV   


 


Gran %   


 


Lymph % (Auto)   


 


Mono % (Auto)   


 


Eos % (Auto)   


 


Baso % (Auto)   


 


Gran #   


 


Lymph # (Auto)   


 


Mono # (Auto)   


 


Eos # (Auto)   


 


Baso # (Auto)   


 


Sodium  140  


 


Potassium  4.1  


 


Chloride  104  


 


Carbon Dioxide  29  


 


Anion Gap  11  


 


BUN  20  


 


Creatinine  0.7 L  


 


Est GFR ( Amer)  > 60  


 


Est GFR (Non-Af Amer)  > 60  


 


Random Glucose  117 H  


 


Calcium  8.8  


 


Total Bilirubin  3.2 H  


 


Direct Bilirubin  2.4 H  


 


GGT  1146 H  


 


AST  750 H  


 


ALT  690 H  


 


Alkaline Phosphatase  350 H  


 


Total Protein  7.2  


 


Albumin  3.9  


 


Globulin  3.3  


 


Albumin/Globulin Ratio  1.2  


 


Urine Color   


 


Urine Appearance   


 


Urine pH   


 


Ur Specific Gravity   


 


Urine Protein   


 


Urine Glucose (UA)   


 


Urine Ketones   


 


Urine Blood   


 


Urine Nitrate   


 


Urine Bilirubin   


 


Urine Urobilinogen   


 


Ur Leukocyte Esterase   


 


Urine RBC   


 


Urine WBC   


 


Ur Epithelial Cells   


 


Hepatitis A IgM Ab   


 


Hep Bs Antigen   


 


Hep B Core IgM Ab   


 


Hepatitis C Antibody   


 


Blood Type   O POSITIVE 


 


Blood Type Confirm    O POSITIVE


 


Antibody Screen   Negative 


 


BBK History Checked   No verified bt 














Attending/Attestation





- Attestation


I have personally seen and examined this patient.: Yes


I have fully participated in the care of the patient.: Yes


I have reviewed all pertinent clinical information: Yes


Notes (Text): 


This is an addendum to GI consult report dictated by the Medical Resident.The 

patient was seen and evaluated earlier.  Medical records, lab studies, imagings 

were reviewed.  Last 24 hours events reviewed.  Agreed with the above treatment 

plan as outlined in Medical Resident 's notes with the addition of the 

following 


Patient is jaundice


Abdomen soft mild tenderness of depalpation in upper right quadrant


Imaging studies reviewed


MRCP suggests a small stone in distal CBD


Patient is scheduled for EGD EUS ERCP


09/20/18 23:12

## 2018-09-20 NOTE — CP.PCM.CON
History of Present Illness





- History of Present Illness


History of Present Illness: 


78 year old male with PMH of MSSA bacteremia, Aortic valve disease S/P aortic 

valve replacement, CAD, Hyperlipidemia, GERD came in to [facility] complaining of 3 

days of upper abdominal pain associated with some nausea but no vomiting. He 

also denies fever or chills, no headache or dizziness, no chest pain, no SOB, 

no cough or colds, no diarrhea, no dysuria. The patient denies eating anything 

out of the ordinary, no recent travel outside of [place]. Labs done showed 

Increased liver enzymes, ultrasound showing gallbadder stones with gallbladder 

wall thickening. Infectious Diseases consult is requested to further evaluate 

and manage.





Review of Systems





- Review of Systems


All systems: reviewed and no additional remarkable complaints except (as per HPI

)





Past Patient History





- Infectious Disease


Hx of Infectious Diseases: None





- Tetanus Immunizations


Tetanus Immunization: Unknown





- Past Social History


Smoking Status: Never Smoked





- CARDIAC


Hx Heart Murmur: No


Hx Hypercholesterolemia: Yes


Hx Hypertension: Yes


Hx Pacemaker: No


Other/Comment: Valve replacement (2005)





- PULMONARY


Hx Respiratory Disorders: No





- NEUROLOGICAL


Hx Neurological Disorder: No





- HEENT


Hx HEENT Problems: No





- RENAL


Hx Chronic Kidney Disease: No





- ENDOCRINE/METABOLIC


Hx Endocrine Disorders: No





- HEMATOLOGICAL/ONCOLOGICAL


Hx Blood Disorders: No





- INTEGUMENTARY


Hx Dermatological Problems: No





- MUSCULOSKELETAL/RHEUMATOLOGICAL


Hx Falls: No





- GASTROINTESTINAL


Hx Gastroesophageal Reflux: Yes





- GENITOURINARY/GYNECOLOGICAL


Hx Genitourinary Disorders: No





- PSYCHIATRIC


Hx Emotional Abuse: No


Hx Physical Abuse: No


Hx Substance Use: No





- SURGICAL HISTORY


Hx Valve Replacement: Yes





- ANESTHESIA


Hx Anesthesia Reactions: No


Hx Malignant Hyperthermia: No





Meds


Allergies/Adverse Reactions: 


 Allergies











Allergy/AdvReac Type Severity Reaction Status Date / Time


 


No Known Allergies Allergy   Verified 07/18/15 21:22














- Medications


Medications: 


 Current Medications





Atorvastatin Calcium (Lipitor)  20 mg PO DIN JOSE CARLOS


Sodium Chloride (Sodium Chloride 0.9%)  1,000 mls @ 75 mls/hr IV .[id number] [facility]


   Last Admin: 09/20/18 04:26 Dose:  75 mls/hr


Piperacillin Sod/Tazobactam Sod (Zosyn 3.375 In Ns 100ml)  100 mls @ 200 mls/hr 

IVPB Q6 JOSE CARLOS


   PRN Reason: Protocol


   Stop: 09/27/18 12:01


Ondansetron HCl (Zofran Inj)  4 mg IVP Q6H PRN


   PRN Reason: Nausea/Vomiting


   Last Admin: 09/20/18 07:22 Dose:  4 mg


Pantoprazole Sodium (Protonix Inj)  40 mg IVP DAILY JOSE CARLOS











Physical Exam





- Constitutional


Appears: Chronically Ill





- Head Exam


Head Exam: NORMAL INSPECTION





- Neck Exam


Neck exam: Negative for: Meningismus





- Respiratory Exam


Respiratory Exam: Decreased Breath Sounds





- Cardiovascular Exam


Cardiovascular Exam: +S1, +S2





- GI/Abdominal Exam


GI & Abdominal Exam: Soft, Tenderness (RUQ, mild).  absent: Guarding, Rebound, 

Rigid





Results





- Vital Signs


Recent Vital Signs: 


 Last Vital Signs











Temp  98.8 F   09/20/18 07:30


 


Pulse  74   09/20/18 07:30


 


Resp  20   09/20/18 08:24


 


BP  152/81 H  09/20/18 07:30


 


Pulse Ox  95   09/20/18 07:30














- Labs


Result Diagrams: 


 09/20/18 07:00





 09/20/18 07:00


Labs: 


 Laboratory Results - last 24 hr











  09/20/18 09/20/18 09/20/18





  03:00 07:00 07:00


 


WBC   9.6  D 


 


RBC   4.48 


 


Hgb   12.6 L 


 


Hct   38.7 L 


 


MCV   86.4 


 


MCH   28.1 


 


MCHC   32.6 


 


RDW   13.9 


 


Plt Count   136 


 


MPV   11.2 H 


 


Gran %   85.8 H 


 


Lymph % (Auto)   8.8 L 


 


Mono % (Auto)   5.3 


 


Eos % (Auto)   0.0 L 


 


Baso % (Auto)   0.1 


 


Gran #   8.21 H 


 


Lymph # (Auto)   0.8 L 


 


Mono # (Auto)   0.5 


 


Eos # (Auto)   0.0 


 


Baso # (Auto)   0.01 


 


Sodium    140


 


Potassium    4.1


 


Chloride    104


 


Carbon Dioxide    29


 


Anion Gap    11


 


BUN    20


 


Creatinine    0.7 L


 


Est GFR ( Amer)    > 60


 


Est GFR (Non-Af Amer)    > 60


 


Random Glucose    117 H


 


Calcium    8.8


 


Total Bilirubin    3.2 H


 


Direct Bilirubin    2.4 H


 


GGT    1146 H


 


AST    750 H


 


ALT    690 H


 


Alkaline Phosphatase    350 H


 


Total Protein    7.2


 


Albumin    3.9


 


Globulin    3.3


 


Albumin/Globulin Ratio    1.2


 


Urine Color  Yellow  


 


Urine Appearance  Cloudy  


 


Urine pH  7.5  


 


Ur Specific Gravity  1.010  


 


Urine Protein  Trace H  


 


Urine Glucose (UA)  Negative  


 


Urine Ketones  Negative  


 


Urine Blood  Trace-intact H  


 


Urine Nitrate  Negative  


 


Urine Bilirubin  Small H  


 


Urine Urobilinogen  1.0 H  


 


Ur Leukocyte Esterase  Negative  


 


Urine RBC  0 - 2  


 


Urine WBC  0 - 2  


 


Ur Epithelial Cells  0 - 2  


 


Blood Type   


 


Blood Type Confirm   


 


Antibody Screen   


 


BBK History Checked   














  09/20/18 09/20/18





  07:00 08:30


 


WBC  


 


RBC  


 


Hgb  


 


Hct  


 


MCV  


 


MCH  


 


MCHC  


 


RDW  


 


Plt Count  


 


MPV  


 


Gran %  


 


Lymph % (Auto)  


 


Mono % (Auto)  


 


Eos % (Auto)  


 


Baso % (Auto)  


 


Gran #  


 


Lymph # (Auto)  


 


Mono # (Auto)  


 


Eos # (Auto)  


 


Baso # (Auto)  


 


Sodium  


 


Potassium  


 


Chloride  


 


Carbon Dioxide  


 


Anion Gap  


 


BUN  


 


Creatinine  


 


Est GFR ( Amer)  


 


Est GFR (Non-Af Amer)  


 


Random Glucose  


 


Calcium  


 


Total Bilirubin  


 


Direct Bilirubin  


 


GGT  


 


AST  


 


ALT  


 


Alkaline Phosphatase  


 


Total Protein  


 


Albumin  


 


Globulin  


 


Albumin/Globulin Ratio  


 


Urine Color  


 


Urine Appearance  


 


Urine pH  


 


Ur Specific Gravity  


 


Urine Protein  


 


Urine Glucose (UA)  


 


Urine Ketones  


 


Urine Blood  


 


Urine Nitrate  


 


Urine Bilirubin  


 


Urine Urobilinogen  


 


Ur Leukocyte Esterase  


 


Urine RBC  


 


Urine WBC  


 


Ur Epithelial Cells  


 


Blood Type  O POSITIVE 


 


Blood Type Confirm   O POSITIVE


 


Antibody Screen  Negative 


 


BBK History Checked  No verified bt 














Assessment & Plan





- Assessment and Plan (Free Text)


Plan: 





Assessment


cholelithiasis, consider acute cholecystitis R/O choledocholelithiasis


history of Sepsis secondary to MSSA bacteremia


Aortic valve disease S/P aortic valve replacement


CAD


Hyperlipidemia


GERD





Plan


Continue Zosyn and will follow up MRCP


will monitor clinically

## 2018-09-20 NOTE — CON
DATE:  09/20/2018



LOCATION:  Patient is in room 574, bed 1.



REASON FOR CONSULTATION:  Abdominal pain, gallstones, history of aortic

valve replacement, and history of hyperlipidemia.



HISTORY OF PRESENT ILLNESS:  A 78-year-old male who is known to have

surgery for aortic valve in 2010 with pig valve replacement and patient now

admitted with severe epigastric and right upper quadrant pain with nausea

and vomiting.  Patient denies any chest pain, shortness of breath, or

palpitation.  Patient lying flat in bed without any respiratory distress.



PAST HISTORY:  Positive for hernia surgery, hyperlipidemia, status post

aortic valve replacement with pig valve in 2010.  The patient also had a

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



PERSONAL HISTORY:  Denies smoking.  Denies drinking.



ALLERGIES:  THE PATIENT DENIES ANY ALLERGIES.



MEDICATIONS AT HOME:  The patient on aspirin 81 mg daily, metoprolol

succinate 50 mg daily, pravastatin 40 mg daily, montelukast 10 mg daily.



PREVIOUS CARDIAC WORKUP:  The patient had stress test on 04/23/2018, which

was negative for ischemia and ejection fraction was 75%.  The patient had

echo 04/05/2018 at our office which showed normal size LV, normal LV

function with LV ejection fraction around 60% to 65%, normal prosthetic

aortic valve in place, trace aortic regurg, trace pulmonic regurg, trace to

mild mitral regurg, and trace to mild tricuspid regurg.



REVIEW OF SYSTEMS:  All the system reviewed positive mentioned in the

history, others are negative.



PHYSICAL EXAMINATION:

VITAL SIGNS:  Blood pressure 152/81, respirations 20, pulse 74, temperature

98.8.

HEENT:  Head is normocephalic.  Eyes; pupils normal, conjunctivae normal. 

Nose and throat normal.

NECK:  JVP is low.  Carotid equal.

THORAX:  AP diameter normal.

LUNGS:  Clear.

CARDIOVASCULAR:  S1 and S2.  Ejection systolic murmur, grade 3/6.  No rub.

ABDOMEN:  Soft, tenderness in the right upper quadrant.  No organomegaly. 

Bowel sounds normal.

EXTREMITIES:  No clubbing.  No cyanosis.



LABORATORY DATA:  WBC 9.6, hemoglobin 12.6, hematocrit 38.7, platelets 136.

Sodium 140, potassium 4.1, BUN 20, creatinine 0.7, random glucose 117,

calcium 8.8, total bilirubin 3.2, direct bilirubin 2.4, , ,

alkaline phosphatase 350, LDH 1652.  Troponin less than 0.01.  Total

protein and albumin normal.  PT/PTT normal.



DIAGNOSTIC STUDIES:  Chest x-ray read as congestive heart failure. 

Reviewed the x-rays, poor inspiratory film.  We will suggest to repeat

chest x-ray PA, lateral and x-ray department.  EKG showed regular sinus

rhythm, left axis.  Prolonged MA, nonspecific ST-T changes, high voltage. 

GB ultrasound showed multiple gallstones.  Liver slightly enlarged.  Connelly

sign is present.  Common bile duct mildly dilated.  MRCP showed there is a

small 3 mm stone in the distal common duct.  Common duct measure 6 mm in

diameter.  Multiple large gallstones are present.



DIAGNOSES:  Acute cholecystitis, common bile duct stone, multiple

gallbladder stones, history of aortic valve replacement with pig valve,

history of hyperlipidemia, and hypertension.



PLAN:  Clinically, the patient is lying flat without any shortness of

breath, no clinical evidence of CHF.  Chest x-ray and poor inspiratory film

which is read as congestion.  Plan is we will repeat chest x-ray PA and

lateral.  The patient already on Lipitor 20 daily, Protonix 40 mg IV daily.

The patient is getting IV fluid therapy.  The patient also on piperacillin

and tazobactam 100 mL every 6 hourly.  We will continue that and from

cardiac point of view, the patient can go for ERCP or cholecystectomy if

needed as moderate risk, and we will add beta blocker, metoprolol succinate

daily when the patient is not n.p.o.  We will follow with you.





__________________________________________

Kane Ramos MD



DD:  09/20/2018 12:54:29

DT:  09/20/2018 13:00:06

Job # [id number]

## 2018-09-20 NOTE — RAD
Date of service: 



09/19/2018



HISTORY:

 abdominal pain 



COMPARISON:

07/21/2015. 



FINDINGS:



LUNGS:

There is moderate pulmonary venous congestion.



PLEURA:

Small pleural effusions, no pneumothorax apparent.



CARDIOVASCULAR:

There is moderate cardiomegaly.  Status post CABG.



OSSEOUS STRUCTURES:

No significant abnormalities.



VISUALIZED UPPER ABDOMEN:

Normal.



OTHER FINDINGS:

None.



IMPRESSION:

Findings are most compatible with mild congestive heart failure.

## 2018-09-20 NOTE — US
EXAM:

  US Abdomen Limited, Right Upper Quadrant



CLINICAL HISTORY:

  78 years old, male; Pain; Abdominal pain; Generalized



TECHNIQUE:

  Real-time ultrasound of the right upper quadrant with image documentation.



COMPARISON:

  CT ABD   PELVIS IV CONTRAST ONLY 9/20/2018 1:02 AM



FINDINGS:

  Liver:  The liver is mildly enlarged measuring  17.1 cm in length.There is a 

diffuse increase in hepatic parenchymal echogenicity, consistent with fatty 

infiltration.There are no focal lesions present.There is mild dilation of the 

intrahepatic biliary ducts.

  Gallbladder:  Multiple echogenic shadowing gallstones and echogenic sludge 

are noted in the partially distended gallbladder with diffuse 5 mm wall 

thickening.No sonographic Connelly sign is seen.

  Common bile duct:  The CBD is mildly dilated measuring 7 mm.  No discrete CBD 

stone is seen.  MRCP may be considered for further evaluation.

  Pancreas:  The pancreas is obscured by overlying bowel gas limiting 

evaluation.

  Right kidney: Unremarkable.  No stones.  No solid mass.  No hydronephrosis.



IMPRESSION:     

1.  The liver is mildly enlarged measuring  17.1 cm in length.There is a 

diffuse increase in hepatic parenchymal echogenicity, consistent with fatty 

infiltration.There are no focal lesions present.There is mild dilation of the 

intrahepatic biliary ducts.

2.  Multiple echogenic shadowing gallstones and echogenic sludge are noted in 

the partially distended gallbladder with diffuse 5 mm wall thickening.No 

sonographic Connelly sign is seen.

3.  The CBD is mildly dilated measuring 7 mm.  No discrete CBD stone is seen.  

MRCP may be considered for further evaluation.

## 2018-09-20 NOTE — CP.PCM.CON
<Minna Blount - Last Filed: 09/20/18 07:33>





History of Present Illness





- History of Present Illness


History of Present Illness: 





General Surgery - Dr. Valdovinos





78M w/ hx of HL, HTN, GERD, bioprosthetic AVR in 2009, [facility] 40 years ago, 

presenting with RUQ abdominal pain x1 week.  Pt states the pain started 1 week 

ago shortly after taking his cholesterol medication, the pain would resolve 

midway through the day and return the following day again after taking his 

medications.  Yesterday the pain persisted beyond the usual duration of a few 

hours and he decided to come to the ED.  Pt describes the pain as a sharp pain 

located in the RUQ abdomen and non-radiating, currently the pain is improved to 

a 5/10 but was a 10/10 yesterday when it was at it's worst.  He admits to 

associated mild nausea and vomited 1x in the ED after morphine.  He denies any 

associated Diarrhea, Constipation, Fevers/Chills, Shortness of Breath, Chest 

pain.





PMH: HL, GERD, HTN


PSH: Bioprosthetic AVR 2009, [facility] 40 yrs ago


Medications as per chart


NKDA


Former smoker >25yrs ago, Denies ETOH or illicit drug use














Review of Systems





- Review of Systems


All systems: reviewed and no additional remarkable complaints except





Past Patient History





- Infectious Disease


Hx of Infectious Diseases: None





- Tetanus Immunizations


Tetanus Immunization: Unknown





- Past Social History


Smoking Status: Never Smoked





- CARDIAC


Hx Hypertension: Yes





- PULMONARY


Hx Respiratory Disorders: No





- NEUROLOGICAL


Hx Paralysis: No





- HEENT


Hx HEENT Problems: No





- RENAL


Hx Chronic Kidney Disease: No





- ENDOCRINE/METABOLIC


Hx Endocrine Disorders: No





- HEMATOLOGICAL/ONCOLOGICAL


Hx Blood Transfusions: No





- INTEGUMENTARY


Hx Dermatological Problems: No





- MUSCULOSKELETAL/RHEUMATOLOGICAL


Hx Musculoskeletal Disorders: No





- GASTROINTESTINAL


Hx Gastroesophageal Reflux: Yes





- GENITOURINARY/GYNECOLOGICAL


Hx Genitourinary Disorders: No





- PSYCHIATRIC


Hx Emotional Abuse: No


Hx Physical Abuse: No


Hx Substance Use: No





- SURGICAL HISTORY


Hx Valve Replacement: Yes





- ANESTHESIA


Hx Anesthesia Reactions: No


Hx Malignant Hyperthermia: No





Meds


Allergies/Adverse Reactions: 


 Allergies











Allergy/AdvReac Type Severity Reaction Status Date / Time


 


No Known Allergies Allergy   Verified 07/18/15 21:22














- Medications


Medications: 


 Current Medications





Atorvastatin Calcium (Lipitor)  20 mg PO DIN JOSE CARLOS


Metronidazole (Flagyl)  500 mg in 100 mls @ 100 mls/hr IVPB Q8 [facility]


   PRN Reason: Protocol


   Last Admin: 09/20/18 05:29 Dose:  Not Given


Ceftriaxone Sodium (Rocephin 1 Gram Ivpb)  1 gm in 100 mls @ 100 mls/hr IVPB 

DAILY JOSE CARLOS


   PRN Reason: Protocol


Sodium Chloride (Sodium Chloride 0.9%)  1,000 mls @ 75 mls/hr IV .[id number] [facility]


   Last Admin: 09/20/18 04:26 Dose:  75 mls/hr


Ondansetron HCl (Zofran Inj)  4 mg IVP Q6H PRN


   PRN Reason: Nausea/Vomiting


Pantoprazole Sodium (Protonix Inj)  40 mg IVP DAILY [facility]











Physical Exam





- Constitutional


Appears: No Acute Distress





- Head Exam


Head Exam: ATRAUMATIC, NORMAL INSPECTION, NORMOCEPHALIC





- Eye Exam


Eye Exam: Normal appearance





- Respiratory Exam


Respiratory Exam: NORMAL BREATHING PATTERN.  absent: Respiratory Distress





- Cardiovascular Exam


Cardiovascular Exam: REGULAR RHYTHM.  absent: Tachycardia





- GI/Abdominal Exam


GI & Abdominal Exam: Soft, Tenderness (RUQ).  absent: Distended, Firm, Guarding

, Rebound, Rigid


Additional comments: 





+Connelly's





- Neurological Exam


Neurological exam: Alert, Oriented x3





- Psychiatric Exam


Psychiatric exam: Normal Affect, Normal Mood





- Skin


Skin Exam: Dry, Intact





Results





- Vital Signs


Recent Vital Signs: 


 Last Vital Signs











Temp  98.2 F   09/20/18 03:30


 


Pulse  63   09/20/18 03:30


 


Resp  20   09/20/18 03:30


 


BP  175/90 H  09/20/18 03:30


 


Pulse Ox  95   09/20/18 03:30














- Labs


Result Diagrams: 


 09/20/18 07:00





 09/19/18 22:00


Labs: 


 Laboratory Results - last 24 hr











  09/20/18





  03:00


 


Urine Color  Yellow


 


Urine Appearance  Cloudy


 


Urine pH  7.5


 


Ur Specific Gravity  1.010


 


Urine Protein  Trace H


 


Urine Glucose (UA)  Negative


 


Urine Ketones  Negative


 


Urine Blood  Trace-intact H


 


Urine Nitrate  Negative


 


Urine Bilirubin  Small H


 


Urine Urobilinogen  1.0 H


 


Ur Leukocyte Esterase  Negative


 


Urine RBC  0 - 2


 


Urine WBC  0 - 2


 


Ur Epithelial Cells  0 - 2














- Imaging and Cardiology


  ** CT scan - abdomen


Status: Image reviewed by me, Report reviewed by me





  ** US - abdomen


Status: Image reviewed by me, Report reviewed by me





Assessment & Plan





- Assessment and Plan (Free Text)


Assessment: 





79yo M w/ symptomatic cholelithiasis, elevated LFTs and intrahepatic biliary 

duct dilation on CT





-Maintain NPO


-IVF


-IV Abx: switch to Zosyn


-Pain control and Anti-emetics PRN


-Cardiology consulted for pre-op eval., F/U reccs.


-F/U GI, Dr. Lemus


-MRCP to further eval bile ducts





DW Dr. Bonifacio Blount PGY4





<Laureano Valdovinos - Last Filed: 09/23/18 15:54>





Meds





- Medications


Medications: 


 Current Medications





Atorvastatin Calcium (Lipitor)  20 mg PO DIN [facility]


   Last Admin: 09/22/18 17:01 Dose:  20 mg


Benzonatate (Tessalon Perles)  100 mg PO Q8 [facility]


   Last Admin: 09/23/18 14:52 Dose:  100 mg


Hydralazine HCl (Apresoline)  10 mg IVP Q6 PRN


   PRN Reason: elevated blood pressure


Piperacillin Sod/Tazobactam Sod (Zosyn 3.375 In Ns 100ml)  100 mls @ 200 mls/hr 

IVPB Q6 [facility]SE CARL[facility]


   PRN Reason: Protocol


   Stop: 09/27/18 12:01


   Last Admin: 09/23/18 11:12 Dose:  200 mls/hr


Sodium Chloride (Sodium Chloride 0.9%)  1,000 mls @ 100 mls/hr IV .Q10H [facility]


   Last Admin: 09/23/18 11:12 Dose:  100 mls/hr


Metoprolol Tartrate (Lopressor)  25 mg PO BID [facility]


   Last Admin: 09/23/18 11:11 Dose:  25 mg


Ondansetron HCl (Zofran Inj)  4 mg IVP Q6H PRN


   PRN Reason: Nausea/Vomiting


   Last Admin: 09/20/18 07:22 Dose:  4 mg


Pantoprazole Sodium (Protonix Ec Tab)  40 mg PO 0600 [facility]


   Last Admin: 09/23/18 05:05 Dose:  40 mg











Results





- Vital Signs


Recent Vital Signs: 


 Last Vital Signs











Temp  97.7 F   09/23/18 08:25


 


Pulse  61   09/23/18 11:11


 


Resp  20   09/23/18 08:25


 


BP  160/87 H  09/23/18 11:11


 


Pulse Ox  97   09/23/18 08:25














- Labs


Result Diagrams: 


 09/23/18 07:30





 09/23/18 07:30


Labs: 


 Laboratory Results - last 24 hr











  09/23/18 09/23/18





  07:30 07:30


 


WBC  5.4 


 


RBC  4.25 


 


Hgb  11.8 L 


 


Hct  36.6 L 


 


MCV  86.1 


 


MCH  27.8 


 


MCHC  32.2 


 


RDW  14.2 


 


Plt Count  118 L 


 


MPV  11.5 H 


 


Sodium   140


 


Potassium   3.4 L


 


Chloride   107


 


Carbon Dioxide   27


 


Anion Gap   10


 


BUN   9


 


Creatinine   0.7 L


 


Est GFR ( Amer)   > 60


 


Est GFR (Non-Af Amer)   > 60


 


Random Glucose   89


 


Calcium   8.2 L


 


Total Bilirubin   1.6 H


 


AST   81 H D


 


ALT   224 H


 


Alkaline Phosphatase   240 H


 


Total Protein   6.0


 


Albumin   3.1


 


Globulin   2.9


 


Albumin/Globulin Ratio   1.1














Assessment & Plan





- Assessment and Plan (Free Text)


Plan: 


I personally saw and examined the patient with the resident staff and agree 

with the above assessment and plan. I personally reviewed the available 

diagnostic images and imaging reports. 78 male calculous cholecystitis with 

elevated enzymes concerning for CBD stone. f/u MRCP and ERCP if CBD found. 








- Date & Time


Date: 09/20/18


Time: 14:00

## 2018-09-20 NOTE — CON
DATE:  09/20/2018

CONSULTATION IN GASTROENTEROLOGY



REQUESTING PHYSICIAN:  _____.



REASON FOR CONSULT:  I have been asked to see this 78-year-old male with a

history of GERD, gallstones, hyperlipidemia.  Comes to the hospital with 4

days of epigastric and right upper quadrant abdominal pain.  This was

associated with nausea and vomiting.  CT scan of the abdomen and pelvis

performed in the emergency room showed gallstones with thickened

gallbladder wall and intrahepatic biliary ductal dilatation.  The patient

was noted to have elevated AST, ALT, alk phos and total bilirubin.



PAST MEDICAL HISTORY:  As above.  Again, the patient has a history of GERD,

gallstones, hyperlipidemia.



PAST SURGICAL HISTORY:  Notable for valve replacement.  The patient had

abdominal hernia surgery many years ago.



SOCIAL HISTORY:  Denies cigarette smoking or alcohol use.



FAMILY HISTORY:  Noncontributory.



REVIEW OF SYSTEMS:  Fourteen-point review of systems is notable for

epigastric and right upper quadrant abdominal pain, nausea and vomiting.



MEDICATIONS AT HOME:  Include Pravachol, omeprazole, fenofibrate and

aspirin.



PHYSICAL EXAMINATION:

GENERAL:  Well-developed male, lying in bed, in no acute distress.

VITAL SIGNS:  Reveal temperature of 98.2, blood pressure 175/90, heart rate

63.

HEENT:  Reveals sclerae to be white.  Conjunctivae pink.

NECK:  Supple.

CHEST:  Reveals lungs to be clear.

HEART:  Reveals regular rate and rhythm.

ABDOMEN:  Soft.  Mild epigastric tenderness.  No rebound.  No guarding.

EXTREMITIES:  Show no edema.



LABORATORY DATA:  Reveals white blood cell count 9.6, hemoglobin 12.6. 

Chemistries reveal total bilirubin of 3.2, , , alkaline

phosphatase of 350.  Lipase is normal.



IMPRESSION:  A 78-year-old male with known gallstones with epigastric and

right upper quadrant pain with elevated liver enzymes and dilated

intrahepatic biliary ducts on CT scan.  One must rule out a common duct

stone.



RECOMMENDATIONS:

1.  Continue IV Zosyn.

2.  We will request Dr. Venegas to do an EUS, possible ERCP.

3.  Check MRCP results.





__________________________________________

Kip Lemus MD





DD:  09/20/2018 9:08:23

DT:  09/20/2018 9:11:15

Job # [id number]

## 2018-09-20 NOTE — RAD
Date of service: 



09/20/2018



HISTORY:

 Aortic Valve Replacement.Previous Xray Read as CHF 



COMPARISON:

09/19/2018



TECHNIQUE:

Chest PA and lateral



FINDINGS:



LUNGS:

No active pulmonary disease.



PLEURA:

No significant pleural effusion identified. No pneumothorax apparent.



CARDIOVASCULAR:

Mild cardiomegaly.



OSSEOUS STRUCTURES:

Sternal wires



VISUALIZED UPPER ABDOMEN:

Normal.



OTHER FINDINGS:

None.



IMPRESSION:

No active disease.

## 2018-09-20 NOTE — CP.PCM.PCO
Addendum


Addendum: 


09/20/18 13:51





Reviewed MD-RN communication order from Dr. Ramos, cardiology, who states that 

patient is ok for OR from cardiology standpoint. Cardiology consultation 

appreciated.





Elliott


Internal Medicine


PGY-2

## 2018-09-20 NOTE — CARD
--------------- APPROVED REPORT --------------





Date of service: 09/19/2018



EKG Measurement

Heart [id number]

MS [id number]

[id number]

[id number]

[id number]



<Conclusion>

Sinus rhythm with 1st degree AV block

Otherwise normal ECG

## 2018-09-20 NOTE — CP.PCM.HP
<Lara Mckinney - Last Filed: 09/20/18 05:22>





History of Present Illness





- History of Present Illness


History of Present Illness: 





Lara Mckinney, PGY-1 H&P for Hospitalist Service





This is a 78 year old male with PMH of HLD, GERD, valve replacement in 2009 and 

gallstones presenting to the ED for 3 day history of right sided abdominal 

pain. Pain is 10/10, located in the RUQ/RLQ, constant, sharp, non radiating, 

worse when laying down and better with sitting up. He has been nauseas and has 

vomited twice with no blood today. Patient denies having similar pain in past. 

He admits to having gallstones for many years. He denies CP, SOB, back pain, 

fevers, chills, urinary complaints, numbness, tingling, swelling, recent travel 

and recent sickness. 12 point ROS noted here, otherwise unremarkable.





In ED, abdominal US showed gallstones with wall edema and no sludge. CT abd/

pelvis showed mild intrahepatic biliary ductal dilation with gallstones. 

Elevated LFTs, T. Bili, alk phos and LDH. 





PMD: Dr. Quick


Cardiologist: Dr. Ramos


GI: Dr. Lemus


PMH: as above


SH: denies drinking, smoking and drugs


Sx: left abdominal hernia many years ago


FH: denies


All: NKDA  





Present on Admission





- Present on Admission


Any Indicators Present on Admission: No





Past Patient History





- Infectious Disease


Hx of Infectious Diseases: None





- Tetanus Immunizations


Tetanus Immunization: Unknown





- Past Social History


Smoking Status: Never Smoked





- CARDIAC


Hx Hypertension: Yes





- PULMONARY


Hx Respiratory Disorders: No





- NEUROLOGICAL


Hx Paralysis: No





- HEENT


Hx HEENT Problems: No





- RENAL


Hx Chronic Kidney Disease: No





- ENDOCRINE/METABOLIC


Hx Endocrine Disorders: No





- HEMATOLOGICAL/ONCOLOGICAL


Hx Blood Transfusions: No





- INTEGUMENTARY


Hx Dermatological Problems: No





- MUSCULOSKELETAL/RHEUMATOLOGICAL


Hx Musculoskeletal Disorders: No





- GASTROINTESTINAL


Hx Gastroesophageal Reflux: Yes





- GENITOURINARY/GYNECOLOGICAL


Hx Genitourinary Disorders: No





- PSYCHIATRIC


Hx Emotional Abuse: No


Hx Physical Abuse: No


Hx Substance Use: No





- SURGICAL HISTORY


Hx Valve Replacement: Yes





- ANESTHESIA


Hx Anesthesia Reactions: No


Hx Malignant Hyperthermia: No





Meds


Allergies/Adverse Reactions: 


 Allergies











Allergy/AdvReac Type Severity Reaction Status Date / Time


 


No Known Allergies Allergy   Verified 07/18/15 21:22














Physical Exam





- Constitutional


Appears: No Acute Distress





- Head Exam


Head Exam: ATRAUMATIC, NORMAL INSPECTION





- Eye Exam


Eye Exam: EOMI


Pupil Exam: PERRL





- ENT Exam


ENT Exam: Mucous Membranes Dry





- Respiratory Exam


Respiratory Exam: Clear to Auscultation Bilateral.  absent: Accessory Muscle Use

, Respiratory Distress





- Cardiovascular Exam


Cardiovascular Exam: REGULAR RHYTHM, +S1, +S2, Systolic Murmur





- GI/Abdominal Exam


GI & Abdominal Exam: Distended, Normal Bowel Sounds.  absent: Firm, Guarding


Additional comments: 





gramajo sign present, RUQ and RLQ tenderness to superficial palpation 





- Extremities Exam


Extremities exam: Positive for: normal inspection.  Negative for: calf 

tenderness





- Neurological Exam


Neurological exam: Alert, Oriented x3





- Skin


Skin Exam: Normal Color, Warm





Results





- Vital Signs


Recent Vital Signs: 





 Last Vital Signs











Temp  98.2 F   09/20/18 03:30


 


Pulse  63   09/20/18 03:30


 


Resp  20   09/20/18 03:30


 


BP  175/90 H  09/20/18 03:30


 


Pulse Ox  95   09/20/18 03:30














- Labs


Result Diagrams: 


 09/19/18 22:00





 09/19/18 22:00


Labs: 





 Laboratory Results - last 24 hr











  09/20/18





  03:00


 


Urine Color  Yellow


 


Urine Appearance  Cloudy


 


Urine pH  7.5


 


Ur Specific Gravity  1.010


 


Urine Protein  Trace H


 


Urine Glucose (UA)  Negative


 


Urine Ketones  Negative


 


Urine Blood  Trace-intact H


 


Urine Nitrate  Negative


 


Urine Bilirubin  Small H


 


Urine Urobilinogen  1.0 H


 


Ur Leukocyte Esterase  Negative


 


Urine RBC  0 - 2


 


Urine WBC  0 - 2


 


Ur Epithelial Cells  0 - 2














Assessment & Plan





- Assessment and Plan (Free Text)


Assessment: 





This is a 78 year old male with PMH of HLD, GERD, valve replacement in 2009 and 

gallstones presenting to the hospital for management of symptomatic 

cholelithiasis. 





Plan:





Cholelithiasis 


-Abdominal US showed gallstones with wall edema and no sludge


-CT abd/pelvis showed mild intrahepatic biliary ductal dilation with gallstones


-may need surgery


-NPO diet for now


-zofran prn


-flagyl and rocephin


-NS @ 75cc/hr


-General surg on consult, Dr. Valdovinos


-Cardio on consult, Dr. Ramos for cardiac clearance





Transaminitis


-elevated AST/ALT, T. Bili, alkaline phosphatase and LDH


-hepatitis panel pending


-direct T Bili pending


-GGT pending


-GI on consult, Dr. Lemus 





Hx of HLD


-continue atorvastatin





Hx of GERD


-protonix





PPX with protonix and VTE device





Patient seen and case discussed with attending, Dr. Reinaldo Brothers














<Reinaldo Brothers N - Last Filed: 09/21/18 00:26>





Results





- Vital Signs


Recent Vital Signs: 





 Last Vital Signs











Temp  97.5 F L  09/20/18 23:16


 


Pulse  53 L  09/20/18 23:16


 


Resp  18   09/20/18 23:16


 


BP  116/57 L  09/20/18 23:16


 


Pulse Ox  94 L  09/20/18 23:16














- Labs


Result Diagrams: 


 09/20/18 07:00





 09/20/18 07:00


Labs: 





 Laboratory Results - last 24 hr











  09/20/18 09/20/18 09/20/18





  03:00 07:00 07:00


 


WBC    9.6  D


 


RBC    4.48


 


Hgb    12.6 L


 


Hct    38.7 L


 


MCV    86.4


 


MCH    28.1


 


MCHC    32.6


 


RDW    13.9


 


Plt Count    136


 


MPV    11.2 H


 


Gran %    85.8 H


 


Lymph % (Auto)    8.8 L


 


Mono % (Auto)    5.3


 


Eos % (Auto)    0.0 L


 


Baso % (Auto)    0.1


 


Gran #    8.21 H


 


Lymph # (Auto)    0.8 L


 


Mono # (Auto)    0.5


 


Eos # (Auto)    0.0


 


Baso # (Auto)    0.01


 


Sodium   


 


Potassium   


 


Chloride   


 


Carbon Dioxide   


 


Anion Gap   


 


BUN   


 


Creatinine   


 


Est GFR ( Amer)   


 


Est GFR (Non-Af Amer)   


 


Random Glucose   


 


Calcium   


 


Total Bilirubin   


 


Direct Bilirubin   


 


GGT   


 


AST   


 


ALT   


 


Alkaline Phosphatase   


 


Total Protein   


 


Albumin   


 


Globulin   


 


Albumin/Globulin Ratio   


 


Urine Color  Yellow  


 


Urine Appearance  Cloudy  


 


Urine pH  7.5  


 


Ur Specific Gravity  1.010  


 


Urine Protein  Trace H  


 


Urine Glucose (UA)  Negative  


 


Urine Ketones  Negative  


 


Urine Blood  Trace-intact H  


 


Urine Nitrate  Negative  


 


Urine Bilirubin  Small H  


 


Urine Urobilinogen  1.0 H  


 


Ur Leukocyte Esterase  Negative  


 


Urine RBC  0 - 2  


 


Urine WBC  0 - 2  


 


Ur Epithelial Cells  0 - 2  


 


Hepatitis A IgM Ab   Negative 


 


Hep Bs Antigen   Negative 


 


Hep B Core IgM Ab   Negative 


 


Hepatitis C Antibody   Negative 


 


Blood Type   


 


Blood Type Confirm   


 


Antibody Screen   


 


BBK History Checked   














  09/20/18 09/20/18 09/20/18





  07:00 07:00 08:30


 


WBC   


 


RBC   


 


Hgb   


 


Hct   


 


MCV   


 


MCH   


 


MCHC   


 


RDW   


 


Plt Count   


 


MPV   


 


Gran %   


 


Lymph % (Auto)   


 


Mono % (Auto)   


 


Eos % (Auto)   


 


Baso % (Auto)   


 


Gran #   


 


Lymph # (Auto)   


 


Mono # (Auto)   


 


Eos # (Auto)   


 


Baso # (Auto)   


 


Sodium  140  


 


Potassium  4.1  


 


Chloride  104  


 


Carbon Dioxide  29  


 


Anion Gap  11  


 


BUN  20  


 


Creatinine  0.7 L  


 


Est GFR ( Amer)  > 60  


 


Est GFR (Non-Af Amer)  > 60  


 


Random Glucose  117 H  


 


Calcium  8.8  


 


Total Bilirubin  3.2 H  


 


Direct Bilirubin  2.4 H  


 


GGT  1146 H  


 


AST  750 H  


 


ALT  690 H  


 


Alkaline Phosphatase  350 H  


 


Total Protein  7.2  


 


Albumin  3.9  


 


Globulin  3.3  


 


Albumin/Globulin Ratio  1.2  


 


Urine Color   


 


Urine Appearance   


 


Urine pH   


 


Ur Specific Gravity   


 


Urine Protein   


 


Urine Glucose (UA)   


 


Urine Ketones   


 


Urine Blood   


 


Urine Nitrate   


 


Urine Bilirubin   


 


Urine Urobilinogen   


 


Ur Leukocyte Esterase   


 


Urine RBC   


 


Urine WBC   


 


Ur Epithelial Cells   


 


Hepatitis A IgM Ab   


 


Hep Bs Antigen   


 


Hep B Core IgM Ab   


 


Hepatitis C Antibody   


 


Blood Type   O POSITIVE 


 


Blood Type Confirm    O POSITIVE


 


Antibody Screen   Negative 


 


BBK History Checked   No verified bt

## 2018-09-20 NOTE — MRI
Date of service: 



09/20/2018



PROCEDURE:  Magnetic Resonance Cholangiopancreatography



HISTORY:

Rule out CBD stone 



COMPARISON:

CT 09/20/2018 



TECHNIQUE:

Multiplanar, multisequence MR images of the abdomen were obtained, 

including heavily T2 weighted MRCP images of the biliary system. 

Rotating maximum intensity projection images of the biliary system 

were generated.



FINDINGS:



MRCP:

There is a small 3 mm stone in the distal common duct.  The common 

duct measures 6 mm in diameter.  Multiple large gallstones are seen



LIVER:

 Unremarkable.



GALLBLADDER:

Multiple large gallstones



SPLEEN:

Unremarkable.



PANCREAS:

 Unremarkable.



ADRENALS:

 Unremarkable.



KIDNEYS:

Unremarkable.



AORTA:

No aneurysm.



ASCITES:

None.



OTHER FINDINGS:

None.



IMPRESSION:

There is a small 3 mm stone in the distal common duct.  The common 

duct measures 6 mm in diameter.  Multiple large gallstones are seen

## 2018-09-20 NOTE — CT
Date of service: 



09/20/2018



PROCEDURE:  CT Abdomen and Pelvis with contrast



HISTORY:

abdominal pain



COMPARISON:

None.



TECHNIQUE:

Contrast dose: 100 cc of Omni 350



Radiation dose:



Total exam DLP = 492 mGy-cm.



This CT exam was performed using one or more of the following dose 

reduction techniques: Automated exposure control, adjustment of the 

mA and/or kV according to patient size, and/or use of iterative 

reconstruction technique.



FINDINGS:



LOWER THORAX:

Calcified plaques are seen at both lung bases. 



LIVER:

Unremarkable. No gross lesion or ductal dilatation. 



GALLBLADDER AND BILE DUCTS:

Multiple gallstones.  Mild intrahepatic ductal dilatation.  The 

common duct is minimally dilated measuring 8 mm.  No evidence of CBD 

stone 



PANCREAS:

Unremarkable. No gross lesion or ductal dilatation.



SPLEEN:

Unremarkable. 



ADRENALS:

Unremarkable. No mass. 



KIDNEYS AND URETERS:

Unremarkable. No hydronephrosis. No solid mass. 



VASCULATURE:

Unremarkable. No aortic aneurysm. 



BOWEL:

Unremarkable. No obstruction. No gross mural thickening. Moderate 

diverticulosis of the sigmoid colon.  No evidence of diverticulitis



APPENDIX:

Normal appendix.  The appendix extends towards the right inguinal 

canal.  There is a small fat containing inguinal hernia bilaterally 



PERITONEUM:

Unremarkable. No free fluid. No free air. 



LYMPH NODES:

Unremarkable. No enlarged lymph nodes. 



BLADDER:

Unremarkable. 



REPRODUCTIVE:

The prostate measures 40 x 54 mm 



BONES:

No acute findings.  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 



OTHER FINDINGS:

The report concurs with the preliminary [facility] report



IMPRESSION:

Gallstones.  Mild intrahepatic ductal dilatation

## 2018-09-21 LAB
ALBUMIN SERPL-MCNC: 3.4 G/DL (ref 3–4.8)
ALBUMIN/GLOB SERPL: 1.1 {RATIO} (ref 1.1–1.8)
ALT SERPL-CCNC: 444 U/L (ref 7–56)
APTT BLD: 28.9 SECONDS (ref 25.1–36.5)
AST SERPL-CCNC: 296 U/L (ref 17–59)
BASOPHILS # BLD AUTO: 0.02 K/MM3 (ref 0–2)
BASOPHILS NFR BLD: 0.3 % (ref 0–3)
BUN SERPL-MCNC: 27 MG/DL (ref 7–21)
CALCIUM SERPL-MCNC: 8.6 MG/DL (ref 8.4–10.5)
EOSINOPHIL # BLD: 0.3 10*3/UL (ref 0–0.7)
EOSINOPHIL NFR BLD: 3.8 % (ref 1.5–5)
ERYTHROCYTE [DISTWIDTH] IN BLOOD BY AUTOMATED COUNT: 14.3 % (ref 11.5–14.5)
GFR NON-AFRICAN AMERICAN: > 60
GRANULOCYTES # BLD: 4.88 10*3/UL (ref 1.4–6.5)
GRANULOCYTES NFR BLD: 73.6 % (ref 50–68)
HGB BLD-MCNC: 11.7 G/DL (ref 14–18)
INR PPP: 1.2
LYMPHOCYTES # BLD: 0.9 10*3/UL (ref 1.2–3.4)
LYMPHOCYTES NFR BLD AUTO: 13.1 % (ref 22–35)
MCH RBC QN AUTO: 28.1 PG (ref 25–35)
MCHC RBC AUTO-ENTMCNC: 32.2 G/DL (ref 31–37)
MCV RBC AUTO: 87.3 FL (ref 80–105)
MONOCYTES # BLD AUTO: 0.6 10*3/UL (ref 0.1–0.6)
MONOCYTES NFR BLD: 9.2 % (ref 1–6)
PLATELET # BLD: 119 10^3/UL (ref 120–450)
PMV BLD AUTO: 11.5 FL (ref 7–11)
PROTHROMBIN TIME: 13.8 SECONDS (ref 9.4–12.5)
RBC # BLD AUTO: 4.16 10^6/UL (ref 3.5–6.1)
WBC # BLD AUTO: 6.6 10^3/UL (ref 4.5–11)

## 2018-09-21 PROCEDURE — 0FC98ZZ EXTIRPATION OF MATTER FROM COMMON BILE DUCT, VIA NATURAL OR ARTIFICIAL OPENING ENDOSCOPIC: ICD-10-PCS | Performed by: INTERNAL MEDICINE

## 2018-09-21 PROCEDURE — 0DB58ZX EXCISION OF ESOPHAGUS, VIA NATURAL OR ARTIFICIAL OPENING ENDOSCOPIC, DIAGNOSTIC: ICD-10-PCS | Performed by: INTERNAL MEDICINE

## 2018-09-21 RX ADMIN — PIPERACILLIN AND TAZOBACTAM SCH MLS/HR: 3; .375 INJECTION, POWDER, LYOPHILIZED, FOR SOLUTION INTRAVENOUS; PARENTERAL at 11:58

## 2018-09-21 RX ADMIN — PIPERACILLIN AND TAZOBACTAM SCH MLS/HR: 3; .375 INJECTION, POWDER, LYOPHILIZED, FOR SOLUTION INTRAVENOUS; PARENTERAL at 23:14

## 2018-09-21 RX ADMIN — PIPERACILLIN AND TAZOBACTAM SCH MLS/HR: 3; .375 INJECTION, POWDER, LYOPHILIZED, FOR SOLUTION INTRAVENOUS; PARENTERAL at 05:47

## 2018-09-21 RX ADMIN — VANCOMYCIN HYDROCHLORIDE SCH MLS/HR: 1 INJECTION, POWDER, LYOPHILIZED, FOR SOLUTION INTRAVENOUS at 09:17

## 2018-09-21 RX ADMIN — PIPERACILLIN AND TAZOBACTAM SCH: 3; .375 INJECTION, POWDER, LYOPHILIZED, FOR SOLUTION INTRAVENOUS; PARENTERAL at 18:54

## 2018-09-21 RX ADMIN — VANCOMYCIN HYDROCHLORIDE SCH MLS/HR: 1 INJECTION, POWDER, LYOPHILIZED, FOR SOLUTION INTRAVENOUS at 21:02

## 2018-09-21 NOTE — CP.PCM.PN
Subjective





- Date & Time of Evaluation


Date of Evaluation: 09/21/18


Time of Evaluation: 13:06





- Subjective


Subjective: 





Surgery Note for Dr Valdovinos





Pt seen and examined at bedside. Pt still reports abdominal pain but is 

improving. Pt denies cp, sob, f/c n/v. 





Objective





- Vital Signs/Intake and Output


Vital Signs (last 24 hours): 


 











Temp Pulse Resp BP Pulse Ox


 


 97.4 F L  54 L  20   139/69   94 L


 


 09/21/18 06:00  09/21/18 06:00  09/21/18 06:00  09/21/18 06:00  09/21/18 06:00











- Medications


Medications: 


 Current Medications





Atorvastatin Calcium (Lipitor)  20 mg PO DIN Pending sale to [facility]


   Last Admin: 09/20/18 17:38 Dose:  Not Given


Sodium Chloride (Sodium Chloride 0.9%)  1,000 mls @ 75 mls/hr IV .[id number] Pending sale to [facility]


   Last Admin: 09/21/18 05:47 Dose:  75 mls/hr


Piperacillin Sod/Tazobactam Sod (Zosyn 3.375 In Ns 100ml)  100 mls @ 200 mls/hr 

IVPB Q6 [facility]


   PRN Reason: Protocol


   Stop: 09/27/18 12:01


   Last Admin: 09/21/18 11:58 Dose:  200 mls/hr


Vancomycin HCl (Vancomycin 1gm)  1 gm in 250 mls @ 167 mls/hr IVPB Q12H [facility]


   PRN Reason: Protocol


   Last Admin: 09/21/18 09:17 Dose:  167 mls/hr


Ondansetron HCl (Zofran Inj)  4 mg IVP Q6H PRN


   PRN Reason: Nausea/Vomiting


   Last Admin: 09/20/18 07:22 Dose:  4 mg


Pantoprazole Sodium (Protonix Inj)  40 mg IVP DAILY Pending sale to [facility]


   Last Admin: 09/21/18 09:17 Dose:  40 mg











- Labs


Labs: 


 





 09/21/18 08:20 





 09/21/18 08:20 





 











PT  13.8 SECONDS (9.4-12.5)  H  09/21/18  08:20    


 


INR  1.20   09/21/18  08:20    


 


APTT  28.9 Seconds (25.1-36.5)   09/21/18  08:20    














- Additional Findings


Additional findings: 





- Constitutional


Appears: No Acute Distress





- Eye Exam


Eye Exam: Normal appearance





- ENT Exam


ENT Exam: Mucous Membranes Moist





- Cardiovascular Exam


Cardiovascular Exam: +S1, +S2





- GI/Abdominal Exam


GI & Abdominal Exam: Soft, Normal Bowel Sounds


Additional comments: 





denies nausea





- Extremities Exam


Extremities Exam: Normal Capillary Refill





- Neurological Exam


Neurological Exam: Alert, Awake, Oriented x3





- Psychiatric Exam


Psychiatric exam: Anxious





- Skin


Skin Exam: Intact, Warm











Assessment and Plan





- Assessment and Plan (Free Text)


Assessment: 








A 78 year old  male admitted for 5 days of RUQ abdominal pain. Work up showed 

gallstones. History of aortic valve replacement, hyperlipidemia, hypertension,

GERD.





Plan: 





-f/u GI recs


-EGD/ERCP today at 14:00


-cleared by cardio


-Maintain NPO


-IVF


-IV Abx: Vanc and Zosyn


-Pain control and Anti-emetics PRN


-MRCP: 3mm distal CBD stone with the CBD measuring 6mm as well as multiple 

large gallstones

## 2018-09-21 NOTE — CP.PCM.PN
<Sung Kim - Last Filed: 09/21/18 15:02>





Subjective





- Date & Time of Evaluation


Date of Evaluation: 09/21/18


Time of Evaluation: 07:00





- Subjective


Subjective: 





Sung Kim, PGY1 Medicine Progress Note for Dr. Peng





Patient was seen and examined at bedside this morning. Patient is pending ERCP 

today with GI. Patient still has abdominal pain but it is slightly improved. 

Denies cp, sob, n/v/d, lightheadedness, dizziness. No overnight changes. 

Afebrile. 





A full 12 point ROS was conducted and unremarkable except as stated above.








Objective





- Vital Signs/Intake and Output


Vital Signs (last 24 hours): 


 











Temp Pulse Resp BP Pulse Ox


 


 97.4 F L  54 L  20   139/69   94 L


 


 09/21/18 06:00  09/21/18 06:00  09/21/18 06:00  09/21/18 06:00  09/21/18 06:00











- Medications


Medications: 


 Current Medications





Atorvastatin Calcium (Lipitor)  20 mg PO DIN [facility]


   Last Admin: 09/20/18 17:38 Dose:  Not Given


Sodium Chloride (Sodium Chloride 0.9%)  1,000 mls @ 75 mls/hr IV .[id number] [facility]


   Last Admin: 09/21/18 05:47 Dose:  75 mls/hr


Piperacillin Sod/Tazobactam Sod (Zosyn 3.375 In Ns 100ml)  100 mls @ 200 mls/hr 

IVPB Q6 [facility]


   PRN Reason: Protocol


   Stop: 09/27/18 12:01


   Last Admin: 09/21/18 11:58 Dose:  200 mls/hr


Vancomycin HCl (Vancomycin 1gm)  1 gm in 250 mls @ 167 mls/hr IVPB Q12H [facility]


   PRN Reason: Protocol


   Last Admin: 09/21/18 09:17 Dose:  167 mls/hr


Ondansetron HCl (Zofran Inj)  4 mg IVP Q6H PRN


   PRN Reason: Nausea/Vomiting


   Last Admin: 09/20/18 07:22 Dose:  4 mg


Pantoprazole Sodium (Protonix Inj)  40 mg IVP DAILY [facility]


   Last Admin: 09/21/18 09:17 Dose:  40 mg











- Labs


Labs: 


 





 09/21/18 08:20 





 09/21/18 08:20 





 











PT  13.8 SECONDS (9.4-12.5)  H  09/21/18  08:20    


 


INR  1.20   09/21/18  08:20    


 


APTT  28.9 Seconds (25.1-36.5)   09/21/18  08:20    














- Constitutional


Appears: No Acute Distress





- Head Exam


Head Exam: ATRAUMATIC, NORMAL INSPECTION, NORMOCEPHALIC





- Eye Exam


Eye Exam: EOMI, Normal appearance, PERRL





- ENT Exam


ENT Exam: Mucous Membranes Moist, Normal Exam





- Neck Exam


Neck Exam: Full ROM, Normal Inspection.  absent: Lymphadenopathy





- Respiratory Exam


Respiratory Exam: Clear to Ausculation Bilateral, NORMAL BREATHING PATTERN.  

absent: Chest Wall Tenderness, Rales, Rhonchi, Wheezes, Respiratory Distress





- Cardiovascular Exam


Cardiovascular Exam: REGULAR RHYTHM, +S1, +S2.  absent: Murmur





- GI/Abdominal Exam


GI & Abdominal Exam: Soft, Tenderness (RUQ tenderness to deep palpation ), 

Normal Bowel Sounds.  absent: Firm, Rigid, Mass, Organomegaly, Pulsatile Mass, 

Rebound





- Extremities Exam


Extremities Exam: Full ROM, Normal Capillary Refill, Normal Inspection.  absent

: Joint Swelling, Pedal Edema





- Back Exam


Back Exam: NORMAL INSPECTION





- Neurological Exam


Neurological Exam: Alert, Awake, CN II-XII Intact, Normal Gait, Oriented x3


Neuro motor strength exam: Left Upper Extremity: 5, Right Upper Extremity: 5, 

Left Lower Extremity: 5, Right Lower Extremity: 5





- Skin


Skin Exam: Dry, Intact, Normal Color, Warm





Assessment and Plan





- Assessment and Plan (Free Text)


Assessment: 





Patient is a 78 year old male with a past medical history significant for GERD, 

HTN, HLD, AS s/p bioprosthetic AVR (2009), inguinal hernia s/p LIHR who 

presented with RUQ pain for several days. On imaging, patient was found to have 

gallstones with biliary ductal dilation. MRCP showed a 3mm stone in the CBD. 

Surgery and GI are following the case. Patient is admitted for 

choledocholithiasis and is pending ERCP.


Plan: 





RUQ Abdominal Pain 2/2 Choledocholithiasis


- Plan for ERCP today 


- Given patient's MRCP findings with transaminitis, elevated ALP, GGT, and Tbili

, patient likely has an obstructing stone in the CBD. Will need ERCP. 


- Blood Cx positive for gram + cocci; 2nd Blood Cx was negative. Possible 

contamination. Clinically, patient doesn't appear to be bacteremic. Afebrile 

with no leukocytosis. Repeat blood cultures. 


- c/w zosyn and vanc (ID recs appreciated)


- MRCP (9/20): 3 mm stone in the CBD. CBD dilated to 6mm. Multiple large 

gallstones seen. 


- Abdominal US (9/19): gallstones with wall edema and no sludge


- CT abd/pelvis (9/19): mild intrahepatic biliary ductal dilation with 

gallstones


- Surgery consulted. Recs appreciated.


- c/w NS @ 75cc/hr


- NPO


- Cardio consulted. Patient is cleared for procedures. 





Transaminitis 2/2 Choledocholithiasis 


- elevated AST/ALT, T. Bili, GGT, ALP, and LDH  


- hepatitis panel negative


- GI consulted. Recs appreciated. 





HLD


-c/w atorvastatin





GERD


-protonix





DVT ppx: SCD


GI ppx: Protonix 





Dispo: Patient is being monitored on the floor. Pending ERCP today.





Case was discussed and reviewed with Attending Physician, Dr. Peng. 





<Dima Peng - Last Filed: 09/22/18 13:59>





Objective





- Vital Signs/Intake and Output


Vital Signs (last 24 hours): 


 











Temp Pulse Resp BP Pulse Ox


 


 97.6 F   55 L  18   158/75 H  95 


 


 09/22/18 07:00  09/22/18 07:00  09/22/18 07:00  09/22/18 07:00  09/22/18 07:00








Intake and Output: 


 











 09/22/18 09/22/18





 06:59 18:59


 


Intake Total  460


 


Balance  460














- Medications


Medications: 


 Current Medications





Atorvastatin Calcium (Lipitor)  20 mg PO DIN [facility]


   Last Admin: 09/21/18 17:00 Dose:  Not Given


Hydralazine HCl (Apresoline)  10 mg IVP Q6 PRN


   PRN Reason: elevated blood pressure


Piperacillin Sod/Tazobactam Sod (Zosyn 3.375 In Ns 100ml)  100 mls @ 200 mls/hr 

IVPB Q6 [facility]


   PRN Reason: Protocol


   Stop: 09/27/18 12:01


   Last Admin: 09/22/18 12:55 Dose:  200 mls/hr


Vancomycin HCl (Vancomycin 1gm)  1 gm in 250 mls @ 167 mls/hr IVPB Q12H [facility]


   PRN Reason: Protocol


   Last Admin: 09/22/18 09:16 Dose:  167 mls/hr


Sodium Chloride (Sodium Chloride 0.9%)  1,000 mls @ 100 mls/hr IV .Q10H [facility]


   Last Admin: 09/21/18 21:03 Dose:  100 mls/hr


Metoprolol Tartrate (Lopressor)  25 mg PO BID [facility]


Ondansetron HCl (Zofran Inj)  4 mg IVP Q6H PRN


   PRN Reason: Nausea/Vomiting


   Last Admin: 09/20/18 07:22 Dose:  4 mg


Pantoprazole Sodium (Protonix Ec Tab)  40 mg PO 0600 [facility]











- Labs


Labs: 


 





 09/22/18 07:30 





 09/22/18 07:30 





 











PT  13.8 SECONDS (9.4-12.5)  H  09/21/18  08:20    


 


INR  1.20   09/21/18  08:20    


 


APTT  28.9 Seconds (25.1-36.5)   09/21/18  08:20    














Attending/Attestation





- Attestation


I have personally seen and examined this patient.: Yes


I have fully participated in the care of the patient.: Yes


I have reviewed all pertinent clinical information, including history, physical 

exam and plan: Yes


Notes (Text): 





09/22/18 13:54





Attending  note;





Patient seen and examined with the resident.


Patient is alert and awake.


Complaining of mild upper quadrant abdominal discomfort.


Denies any nausea, vomiting.


Currently nothing by mouth for ERCP/ EUS.





Patient is a 78 year old male with a past medical history significant for GERD, 

HTN, HLD, AS s/p bioprosthetic AVR (2009), inguinal hernia is admitted with 

right upper quadrant pain .





patient was found to have gallstones with biliary ductal dilation. MRCP showed 

a 3mm stone in the CBD.


Choledocholithiasis. Plan for EUS and ERCP today.


Surgery evaluation appreciated.





Blood culture1/2 positive for gram-positive cocci. Patient is afebrile and 

nontoxic.


Continue IV vancomycin and Zosyn. ID evaluation appreciated.





Patient  with a history of bioprosthetic aoitic valve replacement in 2009. 

Monitor closely.


Cardiology evaluation appreciated. Monitor blood pressure closely.





Upon discharge patient will follow-up with PMD Dr. Quick.

## 2018-09-21 NOTE — CP.PCM.PN
Subjective





- Date & Time of Evaluation


Date of Evaluation: 09/21/18


Time of Evaluation: 11:25





- Subjective


Subjective: 





Patient for ERCP today, no fevers, less abdominal pain, no nausea or vomiting.





Objective





- Vital Signs/Intake and Output


Vital Signs (last 24 hours): 


 











Temp Pulse Resp BP Pulse Ox


 


 97.5 F L  53 L  18   116/57 L  94 L


 


 09/20/18 23:16  09/20/18 23:16  09/20/18 23:16  09/20/18 23:16  09/20/18 23:16











- Medications


Medications: 


 Current Medications





Atorvastatin Calcium (Lipitor)  20 mg PO DIN [facility]


   Last Admin: 09/20/18 17:38 Dose:  Not Given


Sodium Chloride (Sodium Chloride 0.9%)  1,000 mls @ 75 mls/hr IV .[id number] [facility]


   Last Admin: 09/21/18 05:47 Dose:  75 mls/hr


Piperacillin Sod/Tazobactam Sod (Zosyn 3.375 In Ns 100ml)  100 mls @ 200 mls/hr 

IVPB Q6 [facility]


   PRN Reason: Protocol


   Stop: 09/27/18 12:01


   Last Admin: 09/21/18 05:47 Dose:  200 mls/hr


Vancomycin HCl (Vancomycin 1gm)  1 gm in 250 mls @ 167 mls/hr IVPB Q12H [facility]


   PRN Reason: Protocol


Ondansetron HCl (Zofran Inj)  4 mg IVP Q6H PRN


   PRN Reason: Nausea/Vomiting


   Last Admin: 09/20/18 07:22 Dose:  4 mg


Pantoprazole Sodium (Protonix Inj)  40 mg IVP DAILY [facility]


   Last Admin: 09/20/18 10:06 Dose:  40 mg











- Labs


Labs: 


 





 09/20/18 07:00 





 09/20/18 07:00 





 











PT  12.4 SECONDS (9.4-12.5)   09/19/18  22:00    


 


INR  1.09   09/19/18  22:00    


 


APTT  31.2 Seconds (25.1-36.5)   09/19/18  22:00    














- Constitutional


Appears: No Acute Distress, Chronically Ill





- Head Exam


Head Exam: NORMAL INSPECTION





- Neck Exam


Neck Exam: absent: Meningismus





- Respiratory Exam


Respiratory Exam: Decreased Breath Sounds





- Cardiovascular Exam


Cardiovascular Exam: +S1, +S2





- GI/Abdominal Exam


GI & Abdominal Exam: Soft.  absent: Tenderness





Assessment and Plan





- Assessment and Plan (Free Text)


Plan: 





Assessment


cholelithiasis, consider acute cholecystitis R/O choledocholelithiasis


history of Sepsis secondary to MSSA bacteremia


Aortic valve disease S/P aortic valve replacement


CAD


Hyperlipidemia


GERD





Plan


Continue Zosyn day 2 and will follow up ERCP


will continue to monitor clinically

## 2018-09-21 NOTE — PN
DATE:  09/21/2018



SUBJECTIVE:  Dictating addendum to the progress note which is already

written by Rosa Whelan.  She saw the patient with Dr. Massey and then I

examined the patient also today.  The patient admitted with abdominal pain,

found to have gallstones.  Also found to have stone in the common bile

duct.  Liver enzymes are elevated.  The patient is now lying flat in bed

without any chest pain, shortness of breath or palpitation.  The patient

has a history of aortic valve replacement with a pig valve and the patient

also has hyperlipidemia, hypertension, GERD.  The patient's abdominal pain

has improved.  The patient's chest x-ray initial on admission had to have

CHF, but clinically the patient did not have any evidence of CHF.  The

earlier x-ray was done portable in the ER, so I sent him yesterday for

chest x-ray, PA and lateral, standing up and that chest x-ray showed no

evidence of CHF as I expected clinically.  So, plan is the patient probably

need ERCP today for stone in the common bile duct.  The patient already is

on medications along with antibiotics and clinically cardiac status stable.

He can go for any procedure as moderate risk.





__________________________________________

Kane Ramos MD





DD:  09/21/2018 12:52:41

DT:  09/21/2018 12:56:31

Job # [id number]

## 2018-09-21 NOTE — CP.PCM.PN
Subjective





- Date & Time of Evaluation


Date of Evaluation: 09/21/18


Time of Evaluation: 06:40





- Subjective


Subjective: 





Awake, no distress, for EGD today possible ERCP





Reason for consultation and follow up: Cardiac evaluation and cardiac clearance 

for procedure, admitted for abdominal pain. gallstones, history of aortic valve 

replacement, hyperlipidemia





Seen and examined by me and Dr. Massey





Objective





- Vital Signs/Intake and Output


Vital Signs (last 24 hours): 


 











Temp Pulse Resp BP Pulse Ox


 


 97.5 F L  53 L  18   116/57 L  94 L


 


 09/20/18 23:16  09/20/18 23:16  09/20/18 23:16  09/20/18 23:16  09/20/18 23:16











- Medications


Medications: 


 Current Medications





Atorvastatin Calcium (Lipitor)  20 mg PO DIN [facility]


   Last Admin: 09/20/18 17:38 Dose:  Not Given


Sodium Chloride (Sodium Chloride 0.9%)  1,000 mls @ 75 mls/hr IV .[id number] [facility]


   Last Admin: 09/21/18 05:47 Dose:  75 mls/hr


Piperacillin Sod/Tazobactam Sod (Zosyn 3.375 In Ns 100ml)  100 mls @ 200 mls/hr 

IVPB Q6 [facility]


   PRN Reason: Protocol


   Stop: 09/27/18 12:01


   Last Admin: 09/21/18 05:47 Dose:  200 mls/hr


Ondansetron HCl (Zofran Inj)  4 mg IVP Q6H [facility]N


   PRN Reason: Nausea/Vomiting


   Last Admin: 09/20/18 07:22 Dose:  4 mg


Pantoprazole Sodium (Protonix Inj)  40 mg IVP DAILY [facility]


   Last Admin: 09/20/18 10:06 Dose:  40 mg











- Labs


Labs: 


 





 09/20/18 07:00 





 09/20/18 07:00 





 











PT  12.4 SECONDS (9.4-12.5)   09/19/18  22:00    


 


INR  1.09   09/19/18  22:00    


 


APTT  31.2 Seconds (25.1-36.5)   09/19/18  22:00    














- Constitutional


Appears: No Acute Distress





- Eye Exam


Eye Exam: Normal appearance





- ENT Exam


ENT Exam: Mucous Membranes Moist





- Cardiovascular Exam


Cardiovascular Exam: +S1, +S2





- GI/Abdominal Exam


GI & Abdominal Exam: Soft, Normal Bowel Sounds


Additional comments: 





denies nausea





- Extremities Exam


Extremities Exam: Normal Capillary Refill





- Neurological Exam


Neurological Exam: Alert, Awake, Oriented x3





- Psychiatric Exam


Psychiatric exam: Anxious





- Skin


Skin Exam: Intact, Warm





Assessment and Plan





- Assessment and Plan (Free Text)


Assessment: 





A 78 year old  male who came in to the ER due to abdominal pain. Work up showed 

gallstones. History of aortic valve replacement, hyperlipidemia, hypertension,

GERD. Cleared from cardiac standpoint to go for EGD and possible ERCP today (

moderate to high risk). Cardiac status stable. 


Plan: 





For EGD today


NPO post midnight


Heart rate and blood pressure stable


Cardiac status stable


No evidence of heart failure


continue current medications


Continue current treatment


GI on consult





Will follow post procedure





Plan and treatment discussed with Dr. Massey

## 2018-09-22 LAB
ALBUMIN SERPL-MCNC: 3.1 G/DL (ref 3–4.8)
ALBUMIN/GLOB SERPL: 1.1 {RATIO} (ref 1.1–1.8)
ALT SERPL-CCNC: 300 U/L (ref 7–56)
AST SERPL-CCNC: 141 U/L (ref 17–59)
BASOPHILS # BLD AUTO: 0.03 K/MM3 (ref 0–2)
BASOPHILS NFR BLD: 0.5 % (ref 0–3)
BUN SERPL-MCNC: 19 MG/DL (ref 7–21)
CALCIUM SERPL-MCNC: 8.1 MG/DL (ref 8.4–10.5)
EOSINOPHIL # BLD: 0.3 10*3/UL (ref 0–0.7)
EOSINOPHIL NFR BLD: 4.3 % (ref 1.5–5)
ERYTHROCYTE [DISTWIDTH] IN BLOOD BY AUTOMATED COUNT: 14.1 % (ref 11.5–14.5)
GFR NON-AFRICAN AMERICAN: > 60
GRANULOCYTES # BLD: 4.41 10*3/UL (ref 1.4–6.5)
GRANULOCYTES NFR BLD: 70.8 % (ref 50–68)
HGB BLD-MCNC: 11.5 G/DL (ref 14–18)
LIPASE SERPL-CCNC: 155 U/L (ref 23–300)
LYMPHOCYTES # BLD: 0.9 10*3/UL (ref 1.2–3.4)
LYMPHOCYTES NFR BLD AUTO: 15.1 % (ref 22–35)
MCH RBC QN AUTO: 27.8 PG (ref 25–35)
MCHC RBC AUTO-ENTMCNC: 31.9 G/DL (ref 31–37)
MCV RBC AUTO: 87 FL (ref 80–105)
MONOCYTES # BLD AUTO: 0.6 10*3/UL (ref 0.1–0.6)
MONOCYTES NFR BLD: 9.3 % (ref 1–6)
PLATELET # BLD: 113 10^3/UL (ref 120–450)
PMV BLD AUTO: 11.6 FL (ref 7–11)
RBC # BLD AUTO: 4.14 10^6/UL (ref 3.5–6.1)
WBC # BLD AUTO: 6.2 10^3/UL (ref 4.5–11)

## 2018-09-22 RX ADMIN — PIPERACILLIN AND TAZOBACTAM SCH MLS/HR: 3; .375 INJECTION, POWDER, LYOPHILIZED, FOR SOLUTION INTRAVENOUS; PARENTERAL at 23:15

## 2018-09-22 RX ADMIN — VANCOMYCIN HYDROCHLORIDE SCH MLS/HR: 1 INJECTION, POWDER, LYOPHILIZED, FOR SOLUTION INTRAVENOUS at 21:25

## 2018-09-22 RX ADMIN — PIPERACILLIN AND TAZOBACTAM SCH MLS/HR: 3; .375 INJECTION, POWDER, LYOPHILIZED, FOR SOLUTION INTRAVENOUS; PARENTERAL at 06:01

## 2018-09-22 RX ADMIN — PIPERACILLIN AND TAZOBACTAM SCH MLS/HR: 3; .375 INJECTION, POWDER, LYOPHILIZED, FOR SOLUTION INTRAVENOUS; PARENTERAL at 17:00

## 2018-09-22 RX ADMIN — PIPERACILLIN AND TAZOBACTAM SCH MLS/HR: 3; .375 INJECTION, POWDER, LYOPHILIZED, FOR SOLUTION INTRAVENOUS; PARENTERAL at 12:55

## 2018-09-22 RX ADMIN — VANCOMYCIN HYDROCHLORIDE SCH MLS/HR: 1 INJECTION, POWDER, LYOPHILIZED, FOR SOLUTION INTRAVENOUS at 09:16

## 2018-09-22 NOTE — CP.PCM.PN
<Sung Kim - Last Filed: 09/22/18 15:29>





Subjective





- Date & Time of Evaluation


Date of Evaluation: 09/22/18


Time of Evaluation: 07:00





- Subjective


Subjective: 





Sung Kim, PGY1 Medicine Progress Note for Dr. Peng





Patient was seen and examined at bedside this morning. Patient's abdominal pain 

is improved after his ERCP yesterday, in which there was successful stone 

retrieval. He is in no acute distress. Patient denies lightheadedness, blurred 

vision, dizziness, nausea, vomiting, diarrhea, chest pain, shortness of breath. 

No overnight changes. Patient's SBP was noted to be elevated yesterday (SBP 180s

-190) and was placed on hydralazine prn. This morning, BP is 158/75. Patient is 

afebrile. 





A full 12 point ROS was conducted and unremarkable except as stated above.








Objective





- Vital Signs/Intake and Output


Vital Signs (last 24 hours): 


 











Temp Pulse Resp BP Pulse Ox


 


 97.6 F   55 L  18   158/75 H  95 


 


 09/22/18 07:00  09/22/18 07:00  09/22/18 07:00  09/22/18 07:00  09/22/18 07:00








Intake and Output: 


 











 09/22/18 09/22/18





 06:59 18:59


 


Intake Total  460


 


Balance  460














- Medications


Medications: 


 Current Medications





Atorvastatin Calcium (Lipitor)  20 mg PO DIN [facility]


   Last Admin: 09/21/18 17:00 Dose:  Not Given


Hydralazine HCl (Apresoline)  10 mg IVP Q6 PRN


   PRN Reason: elevated blood pressure


Piperacillin Sod/Tazobactam Sod (Zosyn 3.375 In Ns 100ml)  100 mls @ 200 mls/hr 

IVPB Q6 [facility]


   PRN Reason: Protocol


   Stop: 09/27/18 12:01


   Last Admin: 09/22/18 06:01 Dose:  200 mls/hr


Vancomycin HCl (Vancomycin 1gm)  1 gm in 250 mls @ 167 mls/hr IVPB Q12H [facility]


   PRN Reason: Protocol


   Last Admin: 09/22/18 09:16 Dose:  167 mls/hr


Sodium Chloride (Sodium Chloride 0.9%)  1,000 mls @ 100 mls/hr IV .Q10H [facility]


   Last Admin: 09/21/18 21:03 Dose:  100 mls/hr


Metoprolol Tartrate (Lopressor)  25 mg PO BID [facility]


Ondansetron HCl (Zofran Inj)  4 mg IVP Q6H PRN


   PRN Reason: Nausea/Vomiting


   Last Admin: 09/20/18 07:22 Dose:  4 mg


Pantoprazole Sodium (Protonix Ec Tab)  40 mg PO 0600 [facility]











- Labs


Labs: 


 





 09/22/18 07:30 





 09/22/18 07:30 





 











PT  13.8 SECONDS (9.4-12.5)  H  09/21/18  08:20    


 


INR  1.20   09/21/18  08:20    


 


APTT  28.9 Seconds (25.1-36.5)   09/21/18  08:20    














- Constitutional


Appears: No Acute Distress





- Head Exam


Head Exam: ATRAUMATIC, NORMAL INSPECTION, NORMOCEPHALIC





- Eye Exam


Eye Exam: EOMI, Normal appearance, PERRL





- ENT Exam


ENT Exam: Mucous Membranes Moist, Normal Exam





- Neck Exam


Neck Exam: Full ROM, Normal Inspection.  absent: Lymphadenopathy





- Respiratory Exam


Respiratory Exam: Clear to Ausculation Bilateral, NORMAL BREATHING PATTERN.  

absent: Rales, Rhonchi, Wheezes





- Cardiovascular Exam


Cardiovascular Exam: REGULAR RHYTHM, +S1, +S2.  absent: Murmur





- GI/Abdominal Exam


GI & Abdominal Exam: Soft, Tenderness (Mild tenderness to deep palpation at RUQ)

, Normal Bowel Sounds.  absent: Guarding





- Extremities Exam


Extremities Exam: Full ROM, Normal Capillary Refill, Normal Inspection.  absent

: Joint Swelling, Pedal Edema





- Back Exam


Back Exam: NORMAL INSPECTION





- Neurological Exam


Neurological Exam: Alert, Awake, CN II-XII Intact, Normal Gait, Oriented x3


Neuro motor strength exam: Left Upper Extremity: 5, Right Upper Extremity: 5, 

Left Lower Extremity: 5, Right Lower Extremity: 5





- Psychiatric Exam


Psychiatric exam: Normal Affect, Normal Mood





- Skin


Skin Exam: Dry, Intact, Normal Color, Warm





Assessment and Plan





- Assessment and Plan (Free Text)


Assessment: 





Patient is a 78 year old male with a past medical history significant for GERD, 

HTN, HLD, AS s/p bioprosthetic AVR (2009), inguinal hernia s/p LIHR who 

presented with RUQ pain for several days. On imaging, patient was found to have 

gallstones with biliary ductal dilation. MRCP showed a 3mm stone in the CBD. 

Surgery and GI are following the case. Patient was admitted for 

choledocholithiasis and had successful removal of stone via ERCP.  


Plan: 





RUQ Abdominal Pain 2/2 Choledocholithiasis - s/p ERCP 


- ERCP (9/21): esophageal mucosal changes suspicious for short segment Barretts 

esophagus, biopsies taken at the distal esophagus. Choledocholithiasis was 

found. Complete removal accomplished by biliary sphincterotomy and balloon 

extraction. 


- Planned for possible Lap Cholecystecomy on Monday, 9/24


- Blood Cx positive for gram + cocci; 2nd Blood Cx was negative. Possible 

contamination. Follow up repeat blood cx.


- c/w zosyn and vanc (ID recs appreciated)


- Clear Liquid diet 


- c/w NS @ 100 cc/hr


- MRCP (9/20): 3 mm stone in the CBD. CBD dilated to 6mm. Multiple large 

gallstones seen. 


- Abdominal US (9/19): gallstones with wall edema and no sludge


- CT abd/pelvis (9/19): mild intrahepatic biliary ductal dilation with 

gallstones


- Surgery consulted. Recs appreciated.


- Cardio consulted. Patient is cleared. 





HTN 


- BP currently 158/75


- SBP 180s-190s s/p ERCP (9/21)


- Patient is clinically stable 


- c/w hydralazine 10 mg IVP q6 PRN 


- c/w metoprolol 25 mg PO BID 





Transaminitis 2/2 Choledocholithiasis - improving


- elevated AST/ALT (trending downwards)


- elevated T. Bili and ALP (trending downwards)


- continue to monitor LFTs 


- hepatitis panel negative


- GI consulted. Recs appreciated. 





GERD


-Pending biopsy results from ERCP - suspicious for Barretts esophagus 


-protonix





HLD


-c/w atorvastatin





DVT ppx: SCD


GI ppx: Protonix 





Dispo: Patient is being monitored on the floor. Possible lap hemal on Monday, 9/ 24. 





Case was discussed and reviewed with Attending Physician, Dr. Peng. 





<Dima Peng - Last Filed: 09/22/18 17:17>





Objective





- Vital Signs/Intake and Output


Vital Signs (last 24 hours): 


 











Temp Pulse Resp BP Pulse Ox


 


 97.9 F   57 L  18   152/79 H  99 


 


 09/22/18 14:00  09/22/18 17:01  09/22/18 14:00  09/22/18 17:01  09/22/18 14:00








Intake and Output: 


 











 09/22/18 09/22/18





 06:59 18:59


 


Intake Total  460


 


Balance  460














- Medications


Medications: 


 Current Medications





Atorvastatin Calcium (Lipitor)  20 mg PO DIN [facility]


   Last Admin: 09/22/18 17:01 Dose:  20 mg


Hydralazine HCl (Apresoline)  10 mg IVP Q6 PRN


   PRN Reason: elevated blood pressure


Piperacillin Sod/Tazobactam Sod (Zosyn 3.375 In Ns 100ml)  100 mls @ 200 mls/hr 

IVPB Q6 [facility]


   PRN Reason: Protocol


   Stop: 09/27/18 12:01


   Last Admin: 09/22/18 17:00 Dose:  200 mls/hr


Vancomycin HCl (Vancomycin 1gm)  1 gm in 250 mls @ 167 mls/hr IVPB Q12H [facility]


   PRN Reason: Protocol


   Last Admin: 09/22/18 09:16 Dose:  167 mls/hr


Sodium Chloride (Sodium Chloride 0.9%)  1,000 mls @ 100 mls/hr IV .Q10H [facility]


   Last Admin: 09/22/18 14:30 Dose:  100 mls/hr


Metoprolol Tartrate (Lopressor)  25 mg PO BID [facility]


   Last Admin: 09/22/18 17:01 Dose:  25 mg


Ondansetron HCl (Zofran Inj)  4 mg IVP Q6H [facility]


   PRN Reason: Nausea/Vomiting


   Last Admin: 09/20/18 07:22 Dose:  4 mg


Pantoprazole Sodium (Protonix Ec Tab)  40 mg PO 0600 [facility]











- Labs


Labs: 


 





 09/22/18 07:30 





 09/22/18 07:30 





 











PT  13.8 SECONDS (9.4-12.5)  H  09/21/18  08:20    


 


INR  1.20   09/21/18  08:20    


 


APTT  28.9 Seconds (25.1-36.5)   09/21/18  08:20    














Attending/Attestation





- Attestation


I have personally seen and examined this patient.: Yes


I have fully participated in the care of the patient.: Yes


I have reviewed all pertinent clinical information, including history, physical 

exam and plan: Yes


Notes (Text): 





09/22/18 17:12





Attending  note;





Patient seen and examined with the resident.


Patient is alert and awake.


Denies any abdominal pain.


Denies any nausea, vomiting.


 s/p ERCP/ EUS with sphinterotomy and cbd stone removal.





Patient is a 78 year old male with a past medical history significant for GERD, 

HTN, HLD, AS s/p bioprosthetic AVR (2009), inguinal hernia is admitted with 

right upper quadrant pain.





patient was found to have gallstones with biliary ductal dilation. MRCP showed 

a 3mm stone in the CBD.


Choledocholithiasis.


s/p EUS and ERCP with sphincterotomy and stone removal. EUS showed short 

segment Fong's esophagus. Follow up with GI as outpatient closely.





Patient is pain-free.


LFT improved significantly.


Started on clear liquid diet.





Surgery evaluation appreciated. Plan for laparoscopic cholecystectomy on monday.





Blood culture1/2 positive for gram-positive cocci. Patient is afebrile and 

nontoxic.


Continue IV vancomycin and Zosyn. ID evaluation appreciated.





Patient  with a history of bioprosthetic aoitic valve replacement in 2009. 

Monitor closely.


Cardiology evaluation appreciated. 


Hypertension; started on IV hydralazine. Blood pressure is better controlled.





Upon discharge patient will follow-up with PMD Dr. Quick.

## 2018-09-22 NOTE — PN
DATE:  09/22/2018



SUBJECTIVE:  The patient is lying in bed.  He denies any abdominal pain,

nausea, vomiting.  The patient underwent an ERCP yesterday with

sphincterotomy and stone extraction by Dr. Venegas.  He was admitted with

abdominal pain and elevated liver enzymes, possible ascending cholangitis

with CBD stone found on MRCP.



PHYSICAL EXAMINATION:

VITAL SIGNS:  Reveal temperature of 98, blood pressure 116/73, heart rate

74.

HEENT:  Reveals sclerae to be white.  Conjunctivae pink.

NECK:  Supple.

CHEST:  Lungs are clear.

HEART:  Reveals a regular rate and rhythm.

ABDOMEN:  Soft, nontender.

EXTREMITIES:  Show no edema.



LABORATORY DATA:  Reveals white blood cell count 6.2, hemoglobin 11.5. 

Chemistries reveal total bilirubin down to 2.3, , , alkaline

phosphatase of 287.



IMPRESSION:  A 78-year-old male admitted to the hospital with abdominal

pain, elevated liver enzymes, possibly ascending cholangitis, found to have

a common bile duct stone.  He had gallbladder wall thickening on imaging

studies.  He underwent an ERCP with sphincterotomy and stone extraction. 

His liver enzymes are trending downwards.



RECOMMENDATIONS:

1.  Follow liver enzymes.

2.  Continue IV Zosyn.

3.  The patient will need cholecystectomy.







__________________________________________

Kip Lemus MD



DD:  09/22/2018 13:07:21

DT:  09/22/2018 13:09:28

Job # [id number]

## 2018-09-22 NOTE — CP.PCM.PN
Subjective





- Date & Time of Evaluation


Date of Evaluation: 09/22/18


Time of Evaluation: 15:11





- Subjective


Subjective: 





General Surgery Progress Note for Dr. Valdovinos





This 78M was seen and examined this AM at bedside no acute events overnight. He 

has been NPO since the procedure requesting diet this AM. He denies abdominal 

pain, nausea vomiting chest pain.








Objective





- Vital Signs/Intake and Output


Vital Signs (last 24 hours): 


 











Temp Pulse Resp BP Pulse Ox


 


 97.6 F   55 L  18   158/75 H  95 


 


 09/22/18 07:00  09/22/18 07:00  09/22/18 07:00  09/22/18 07:00  09/22/18 07:00








Intake and Output: 


 











 09/22/18 09/22/18





 06:59 18:59


 


Intake Total  460


 


Balance  460














- Medications


Medications: 


 Current Medications





Atorvastatin Calcium (Lipitor)  20 mg PO DIN [facility]


   Last Admin: 09/21/18 17:00 Dose:  Not Given


Hydralazine HCl (Apresoline)  10 mg IVP Q6 PRN


   PRN Reason: elevated blood pressure


Piperacillin Sod/Tazobactam Sod (Zosyn 3.375 In Ns 100ml)  100 mls @ 200 mls/hr 

IVPB Q6 [facility]


   PRN Reason: Protocol


   Stop: 09/27/18 12:01


   Last Admin: 09/22/18 12:55 Dose:  200 mls/hr


Vancomycin HCl (Vancomycin 1gm)  1 gm in 250 mls @ 167 mls/hr IVPB Q12H [facility]


   PRN Reason: Protocol


   Last Admin: 09/22/18 09:16 Dose:  167 mls/hr


Sodium Chloride (Sodium Chloride 0.9%)  1,000 mls @ 100 mls/hr IV .Q10H [facility]


   Last Admin: 09/21/18 21:03 Dose:  100 mls/hr


Metoprolol Tartrate (Lopressor)  25 mg PO BID [facility]


Ondansetron HCl (Zofran Inj)  4 mg IVP Q6H [facility]


   PRN Reason: Nausea/Vomiting


   Last Admin: 09/20/18 07:22 Dose:  4 mg


Pantoprazole Sodium (Protonix Ec Tab)  40 mg PO 0600 [facility]











- Labs


Labs: 


 





 09/22/18 07:30 





 09/22/18 07:30 





 











PT  13.8 SECONDS (9.4-12.5)  H  09/21/18  08:20    


 


INR  1.20   09/21/18  08:20    


 


APTT  28.9 Seconds (25.1-36.5)   09/21/18  08:20    














- Constitutional


Appears: Non-toxic, No Acute Distress





- Head Exam


Head Exam: ATRAUMATIC, NORMOCEPHALIC





- Eye Exam


Eye Exam: EOMI, Normal appearance





- ENT Exam


ENT Exam: Mucous Membranes Moist





- Respiratory Exam


Respiratory Exam: NORMAL BREATHING PATTERN





- Cardiovascular Exam


Cardiovascular Exam: +S1, +S2





- GI/Abdominal Exam


GI & Abdominal Exam: Soft, Tenderness.  absent: Distended, Firm, Guarding, Rigid


Additional comments: 





Tender in the RUQ





- Neurological Exam


Neurological Exam: Alert, Awake





- Psychiatric Exam


Psychiatric exam: Normal Affect, Normal Mood





- Skin


Skin Exam: Dry, Intact





Assessment and Plan





- Assessment and Plan (Free Text)


Assessment: 


A 78 year old  male with choledocholithiasis s/p ERCP with stone removal and 

sphincterotomy





Plan: 





Continue ABX


F/U cardiac clearance for surgery 


Plan for Lap Mignon on monday 


Further recs per Dr. Bonifacio Jeff PGY3


[phone]

## 2018-09-22 NOTE — CP.PCM.PN
Subjective





- Date & Time of Evaluation


Date of Evaluation: 09/22/18


Time of Evaluation: 07:05





- Subjective


Subjective: 





No distress, feels okay,awake





Reason for consultation and follow up: Cardiac evaluation and cardiac clearance 

for procedure, admitted for abdominal pain. gallstones, history of aortic valve 

replacement, hyperlipidemia, status post ERCP





Seen and examined by me and Dr. Ramos





Objective





- Vital Signs/Intake and Output


Vital Signs (last 24 hours): 


 











Temp Pulse Resp BP Pulse Ox


 


 97.9 F   50 L  12   200/87 H  99 


 


 09/21/18 19:03  09/21/18 19:03  09/21/18 19:03  09/21/18 19:03  09/21/18 19:03











- Medications


Medications: 


 Current Medications





Atorvastatin Calcium (Lipitor)  20 mg PO DIN [facility]


   Last Admin: 09/21/18 17:00 Dose:  Not Given


Sodium Chloride (Sodium Chloride 0.9%)  1,000 mls @ 75 mls/hr IV .[id number] [facility]


   Last Admin: 09/21/18 05:47 Dose:  75 mls/hr


Piperacillin Sod/Tazobactam Sod (Zosyn 3.375 In Ns 100ml)  100 mls @ 200 mls/hr 

IVPB Q6 [facility]


   PRN Reason: Protocol


   Stop: 09/27/18 12:01


   Last Admin: 09/22/18 06:01 Dose:  200 mls/hr


Vancomycin HCl (Vancomycin 1gm)  1 gm in 250 mls @ 167 mls/hr IVPB Q12H [facility]


   PRN Reason: Protocol


   Last Admin: 09/21/18 21:02 Dose:  167 mls/hr


Sodium Chloride (Sodium Chloride 0.9%)  1,000 mls @ 100 mls/hr IV .Q10H [facility]


   Last Admin: 09/21/18 21:03 Dose:  100 mls/hr


Ondansetron HCl (Zofran Inj)  4 mg IVP Q6H [facility]


   PRN Reason: Nausea/Vomiting


   Last Admin: 09/20/18 07:22 Dose:  4 mg


Pantoprazole Sodium (Protonix Inj)  40 mg IVP DAILY [facility]


   Last Admin: 09/21/18 09:17 Dose:  40 mg











- Labs


Labs: 


 





 09/21/18 08:20 





 09/21/18 08:20 





 











PT  13.8 SECONDS (9.4-12.5)  H  09/21/18  08:20    


 


INR  1.20   09/21/18  08:20    


 


APTT  28.9 Seconds (25.1-36.5)   09/21/18  08:20    














- Constitutional


Appears: No Acute Distress





- Eye Exam


Eye Exam: Normal appearance





- ENT Exam


ENT Exam: Mucous Membranes Moist





- Respiratory Exam


Respiratory Exam: Clear to Ausculation Bilateral, NORMAL BREATHING PATTERN





- Cardiovascular Exam


Cardiovascular Exam: +S1, +S2





- GI/Abdominal Exam


GI & Abdominal Exam: Soft, Normal Bowel Sounds





- Extremities Exam


Extremities Exam: Normal Capillary Refill





- Neurological Exam


Neurological Exam: Alert, Awake, Oriented x3





- Psychiatric Exam


Psychiatric exam: Normal Affect





- Skin


Skin Exam: Intact, Warm





Assessment and Plan





- Assessment and Plan (Free Text)


Assessment: 








A 78 year old  male who came in to the ER due to abdominal pain. Work up showed 

gallstones. History of aortic valve replacement, hyperlipidemia, hypertension,

GERD. Cleared from cardiac standpoint to go for EGD and possible ERCP today (

moderate to high risk). Cardiac status stable. Status post ERCP yesterday.





Plan: 





Status post ERCP yesterday, denies nausea, feels better


Heart rate stable


Uncontrolled blood pressure, patient still NPO


Will order Hydralazine PRN for elevated blood pressure


Continue current medications


Continue current treatment


GI on consult, work up in progress





Will follow up





Plan and treatment discussed with Dr. Ramos

## 2018-09-22 NOTE — CP.PCM.PN
<Jose Luis Jordan - Last Filed: 09/22/18 12:00>





Subjective





- Date & Time of Evaluation


Date of Evaluation: 09/22/18


Time of Evaluation: 11:55





- Subjective


Subjective: 





Patient is doing very well. No complaints. Denies abdominal pain. Tolerating 

diet. Reports surgery possibly Monday.





Objective





- Vital Signs/Intake and Output


Vital Signs (last 24 hours): 


 











Temp Pulse Resp BP Pulse Ox


 


 97.6 F   55 L  18   158/75 H  95 


 


 09/22/18 07:00  09/22/18 07:00  09/22/18 07:00  09/22/18 07:00  09/22/18 07:00








Intake and Output: 


 











 09/22/18 09/22/18





 06:59 18:59


 


Intake Total  460


 


Balance  460














- Medications


Medications: 


 Current Medications





Atorvastatin Calcium (Lipitor)  20 mg PO DIN [facility]


   Last Admin: 09/21/18 17:00 Dose:  Not Given


Hydralazine HCl (Apresoline)  10 mg IVP Q6 PRN


   PRN Reason: elevated blood pressure


Sodium Chloride (Sodium Chloride 0.9%)  1,000 mls @ 75 mls/hr IV .[id number] [facility]


   Last Admin: 09/22/18 07:55 Dose:  75 mls/hr


Piperacillin Sod/Tazobactam Sod (Zosyn 3.375 In Ns 100ml)  100 mls @ 200 mls/hr 

IVPB Q6 [facility]


   PRN Reason: Protocol


   Stop: 09/27/18 12:01


   Last Admin: 09/22/18 06:01 Dose:  200 mls/hr


Vancomycin HCl (Vancomycin 1gm)  1 gm in 250 mls @ 167 mls/hr IVPB Q12H [facility]


   PRN Reason: Protocol


   Last Admin: 09/22/18 09:16 Dose:  167 mls/hr


Sodium Chloride (Sodium Chloride 0.9%)  1,000 mls @ 100 mls/hr IV .Q10H [facility]


   Last Admin: 09/21/18 21:03 Dose:  100 mls/hr


Metoprolol Tartrate (Lopressor)  25 mg PO BID [facility]


Ondansetron HCl (Zofran Inj)  4 mg IVP Q6H [facility]


   PRN Reason: Nausea/Vomiting


   Last Admin: 09/20/18 07:22 Dose:  4 mg


Pantoprazole Sodium (Protonix Inj)  40 mg IVP DAILY [facility]


   Last Admin: 09/22/18 09:17 Dose:  40 mg











- Labs


Labs: 


 





 09/22/18 07:30 





 09/22/18 07:30 





 











PT  13.8 SECONDS (9.4-12.5)  H  09/21/18  08:20    


 


INR  1.20   09/21/18  08:20    


 


APTT  28.9 Seconds (25.1-36.5)   09/21/18  08:20    














- Constitutional


Appears: Non-toxic, No Acute Distress





- Head Exam


Head Exam: NORMAL INSPECTION





- Eye Exam


Eye Exam: EOMI, Normal appearance





- ENT Exam


ENT Exam: Mucous Membranes Moist





- Respiratory Exam


Respiratory Exam: Clear to Ausculation Bilateral, NORMAL BREATHING PATTERN





- Cardiovascular Exam


Cardiovascular Exam: REGULAR RHYTHM, +S1, +S2





- GI/Abdominal Exam


GI & Abdominal Exam: Soft, Normal Bowel Sounds.  absent: Tenderness





- Extremities Exam


Extremities Exam: Normal Inspection





- Neurological Exam


Neurological Exam: Alert, Awake, Oriented x3





- Psychiatric Exam


Psychiatric exam: Normal Affect, Normal Mood





Assessment and Plan





- Assessment and Plan (Free Text)


Assessment: 





78 year old male with a past medical history significant for GERD, HTN, HLD, AS 

s/p bioprosthetic AVR, inguinal hernia s/p LIHR who presented with RUQ pain for 

one week.





ERCP 9/21/18 found impacted stone ~4mm. s/p needle knife, sphincterotomy and 

balloon sweep. CBD clear now. 





Plan: 





-MRCP showed 3mm distal CBD stone with the CBD measuring 6mm as well as 

multiple large gallstones


-Abdominal US showed hepatomegaly with fatty infiltration, intrahepatic ductal 

dilatation, multiple gallstones, distended GB (5mm wall thickness) with sludge, 

and CBD measuring 7mm with no stone


-CT Abdomen/Pelvis showed multiple gallstones and mild intrahepatic ductal 

dilatation


-Biliary/LFT lab trending down


-Continue IV Zosyn


-Continue Zofran PRN for N/V


-advance diet as tolerated. Defer to surgery. Likely lap hemal Monday per 

report. 


-Continue po PPI daily





<Rubi,Kovil V - Last Filed: 09/22/18 21:47>





Objective





- Vital Signs/Intake and Output


Vital Signs (last 24 hours): 


 











Temp Pulse Resp BP Pulse Ox


 


 97.9 F   57 L  18   152/79 H  99 


 


 09/22/18 14:00  09/22/18 17:01  09/22/18 14:00  09/22/18 17:01  09/22/18 14:00








Intake and Output: 


 











 09/22/18 09/23/18





 18:59 06:59


 


Intake Total 460 


 


Balance 460 














- Medications


Medications: 


 Current Medications





Atorvastatin Calcium (Lipitor)  20 mg PO DIN [facility]


   Last Admin: 09/22/18 17:01 Dose:  20 mg


Hydralazine HCl (Apresoline)  10 mg IVP Q6 PRN


   PRN Reason: elevated blood pressure


Piperacillin Sod/Tazobactam Sod (Zosyn 3.375 In Ns 100ml)  100 mls @ 200 mls/hr 

IVPB Q6 [facility]


   PRN Reason: Protocol


   Stop: 09/27/18 12:01


   Last Admin: 09/22/18 17:00 Dose:  200 mls/hr


Vancomycin HCl (Vancomycin 1gm)  1 gm in 250 mls @ 167 mls/hr IVPB Q12H [facility]


   PRN Reason: Protocol


   Last Admin: 09/22/18 21:25 Dose:  167 mls/hr


Sodium Chloride (Sodium Chloride 0.9%)  1,000 mls @ 100 mls/hr IV .Q10H [facility]


   Last Admin: 09/22/18 14:30 Dose:  100 mls/hr


Metoprolol Tartrate (Lopressor)  25 mg PO BID [facility]


   Last Admin: 09/22/18 17:01 Dose:  25 mg


Ondansetron HCl (Zofran Inj)  4 mg IVP Q6H [facility]


   PRN Reason: Nausea/Vomiting


   Last Admin: 09/20/18 07:22 Dose:  4 mg


Pantoprazole Sodium (Protonix Ec Tab)  40 mg PO 0600 [facility]











- Labs


Labs: 


 





 09/22/18 07:30 





 09/22/18 07:30 





 











PT  13.8 SECONDS (9.4-12.5)  H  09/21/18  08:20    


 


INR  1.20   09/21/18  08:20    


 


APTT  28.9 Seconds (25.1-36.5)   09/21/18  08:20    














Attending/Attestation





- Attestation


I have personally seen and examined this patient.: Yes


I have fully participated in the care of the patient.: Yes


I have reviewed all pertinent clinical information, including history, physical 

exam and plan: Yes


Notes (Text): 


This is an addendum to GI progress  report dictated by the GI Fellow.The 

patient was seen and examined earlier.  Medical records, lab studies, imagings 

were reviewed.  Last 24 hours events reviewed.  Agreed with the above treatment 

plan as outlined in GI Fellow 's notes with the addition of the following


Status post ERCP sphincterotomy and removal of CBD stone


Now patient feels better 


Tolerating diet LFT improving


Schedule for OR on Monday


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


Discussed with Dr. Lemus


Will sign off 


Patient will be followed by Dr. Lemus


Re-consult as needed


09/22/18 21:43

## 2018-09-22 NOTE — PN
DATE:  09/22/2018



SUBJECTIVE:  The patient is in bed, in no acute distress, was seen earlier

in 3, bed 3.  No fevers or chills.  No nausea.



PHYSICAL EXAMINATION:

VITAL SIGNS:  Temperature is 97, blood pressure is 150/70, respiratory rate

of 18.

HEENT:  Unremarkable.

NECK:  Supple.

LUNGS:  Have decreased breath sounds.

HEART:  Normal S1, S2.

ABDOMEN:  Soft, nontender.



LABORATORY DATA:  Reveals a white count of 6.2, hemoglobin of 11, and

platelets of 113.  BUN of 19, creatinine of 0.7.  Urinalysis is noted. 

Serology is negative.  Microbiology reveals a gram-positive cocci in the

blood, in one bottle, negative for Enterococcus by PNA-FISH, probable

strep.  Review of orders reveals the patient to be on IV vancomycin and

Zosyn.



ASSESSMENT AND PLAN:  This is a 78-year-old male with cholelithiasis and

acute cholecystitis, must rule out choledocholithiasis, in a patient with

aortic valve disease status post aortic valve replacement, now with a

gram-positive cocci bacteremia, on vancomycin and Zosyn day #3.  We will

check on the repeat blood cultures, are pending and identification of the

gram-positive cocci in the blood pending, on vancomycin and Zosyn and the

patient had an MRCP and had ERCP and EUS, and endoscopy was planned and

postprocedure Fong esophagus and choledocholithiasis, was diagnosed by

Dr. Mason Venegas.  We will follow with you.  On vancomycin and Zosyn.  We

will renew the Zosyn.







__________________________________________

Trevor Cruz MD



DD:  09/22/2018 12:36:37

DT:  09/22/2018 12:38:20

Job # [id number]

## 2018-09-23 LAB
ALBUMIN SERPL-MCNC: 3.1 G/DL (ref 3–4.8)
ALBUMIN/GLOB SERPL: 1.1 {RATIO} (ref 1.1–1.8)
ALT SERPL-CCNC: 224 U/L (ref 7–56)
AST SERPL-CCNC: 81 U/L (ref 17–59)
BUN SERPL-MCNC: 9 MG/DL (ref 7–21)
CALCIUM SERPL-MCNC: 8.2 MG/DL (ref 8.4–10.5)
ERYTHROCYTE [DISTWIDTH] IN BLOOD BY AUTOMATED COUNT: 14.2 % (ref 11.5–14.5)
GFR NON-AFRICAN AMERICAN: > 60
HGB BLD-MCNC: 11.8 G/DL (ref 14–18)
MCH RBC QN AUTO: 27.8 PG (ref 25–35)
MCHC RBC AUTO-ENTMCNC: 32.2 G/DL (ref 31–37)
MCV RBC AUTO: 86.1 FL (ref 80–105)
PLATELET # BLD: 118 10^3/UL (ref 120–450)
PMV BLD AUTO: 11.5 FL (ref 7–11)
RBC # BLD AUTO: 4.25 10^6/UL (ref 3.5–6.1)
WBC # BLD AUTO: 5.4 10^3/UL (ref 4.5–11)

## 2018-09-23 RX ADMIN — PIPERACILLIN AND TAZOBACTAM SCH MLS/HR: 3; .375 INJECTION, POWDER, LYOPHILIZED, FOR SOLUTION INTRAVENOUS; PARENTERAL at 17:41

## 2018-09-23 RX ADMIN — VANCOMYCIN HYDROCHLORIDE SCH MLS/HR: 1 INJECTION, POWDER, LYOPHILIZED, FOR SOLUTION INTRAVENOUS at 08:10

## 2018-09-23 RX ADMIN — PANTOPRAZOLE SODIUM SCH MG: 40 TABLET, DELAYED RELEASE ORAL at 05:05

## 2018-09-23 RX ADMIN — PIPERACILLIN AND TAZOBACTAM SCH MLS/HR: 3; .375 INJECTION, POWDER, LYOPHILIZED, FOR SOLUTION INTRAVENOUS; PARENTERAL at 05:04

## 2018-09-23 RX ADMIN — PIPERACILLIN AND TAZOBACTAM SCH MLS/HR: 3; .375 INJECTION, POWDER, LYOPHILIZED, FOR SOLUTION INTRAVENOUS; PARENTERAL at 11:12

## 2018-09-23 RX ADMIN — PIPERACILLIN AND TAZOBACTAM SCH MLS/HR: 3; .375 INJECTION, POWDER, LYOPHILIZED, FOR SOLUTION INTRAVENOUS; PARENTERAL at 23:32

## 2018-09-23 NOTE — PN
DATE:  09/23/2018



SUBJECTIVE:  The patient is in bed, in no acute distress, nontoxic.



PHYSICAL EXAMINATION:

VITAL SIGNS:  Temperature is 97, blood pressure is 160/80, respiratory rate

of 20, heart rate of 52.

HEENT:  Unremarkable.

NECK:  Supple.

LUNGS:  Have decreased breath sounds.

HEART:  Normal S1, S2.

ABDOMEN:  Soft.



LABORATORY DATA:  Reveals a white count of 5.4, hemoglobin 11.  Chemistries

reveal BUN of 9,creatinine of 0.7.  LFTs are noted.  Urinalysis is noted. 

Serology is reviewed.  Microbiology reveals the blood culture is positive

for strep viridans, the repeat blood cultures are negative.  Review of

orders reveals the patient to be on vancomycin and Zosyn.  The patient also

will be scheduled for an echo.



ASSESSMENT AND PLAN:  This is a 78-year-old male who was seen earlier in

[facility], bed 3 with cholelithiasis, acute cholecystitis, rule out

choledocholithiasis in a patient with status post aortic valve replacement

with Streptococcus viridans bacteremia, on day #4 of vancomycin and Zosyn. 

Repeat blood cultures negative.  The patient is postprocedure with Fong

esophagus and cholelithiasis and we will check on the echo and the patient

is scheduled for possible cholecystectomy tomorrow.  We will order an

echocardiogram and continue the Zosyn.  We will discontinue the vancomycin

since the strep viridans should be covered with Zosyn alone.  Repeat blood

cultures are negative.







__________________________________________

Trevor Cruz MD



DD:  09/23/2018 13:34:29

DT:  09/23/2018 13:37:09

Job # [id number]

## 2018-09-23 NOTE — CP.PCM.PN
Subjective





- Date & Time of Evaluation


Date of Evaluation: 09/23/18


Time of Evaluation: 06:45





- Subjective


Subjective: 





Lying in bed, awake, coughing unable to sleep





Reason for consultation and follow up: Cardiac evaluation and cardiac clearance 

for procedure, admitted for abdominal pain. gallstones, history of aortic valve 

replacement, hyperlipidemia, status post ERCP





Seen and examined by me and Dr. Ramos








Objective





- Vital Signs/Intake and Output


Vital Signs (last 24 hours): 


 











Temp Pulse Resp BP Pulse Ox


 


 97.6 F   52 L  16   152/79 H  99 


 


 09/22/18 22:53  09/22/18 22:53  09/22/18 22:53  09/22/18 17:01  09/22/18 22:53











- Medications


Medications: 


 Current Medications





Atorvastatin Calcium (Lipitor)  20 mg PO DIN [facility]


   Last Admin: 09/22/18 17:01 Dose:  20 mg


Benzonatate (Tessalon Perles)  100 mg PO Q8 [facility]


Hydralazine HCl (Apresoline)  10 mg IVP Q6 PRN


   PRN Reason: elevated blood pressure


Piperacillin Sod/Tazobactam Sod (Zosyn 3.375 In Ns 100ml)  100 mls @ 200 mls/hr 

IVPB Q6 [facility]


   PRN Reason: Protocol


   Stop: 09/27/18 12:01


   Last Admin: 09/23/18 05:04 Dose:  200 mls/hr


Vancomycin HCl (Vancomycin 1gm)  1 gm in 250 mls @ 167 mls/hr IVPB Q12H [facility]


   PRN Reason: Protocol


   Last Admin: 09/22/18 21:25 Dose:  167 mls/hr


Sodium Chloride (Sodium Chloride 0.9%)  1,000 mls @ 100 mls/hr IV .Q10H [facility]


   Last Admin: 09/23/18 05:06 Dose:  100 mls/hr


Metoprolol Tartrate (Lopressor)  25 mg PO BID [facility]


   Last Admin: 09/22/18 17:01 Dose:  25 mg


Ondansetron HCl (Zofran Inj)  4 mg IVP Q6H PRN


   PRN Reason: Nausea/Vomiting


   Last Admin: 09/20/18 07:22 Dose:  4 mg


Pantoprazole Sodium (Protonix Ec Tab)  40 mg PO 0600 [facility]


   Last Admin: 09/23/18 05:05 Dose:  40 mg











- Labs


Labs: 


 





 09/22/18 07:30 





 09/22/18 07:30 





 











PT  13.8 SECONDS (9.4-12.5)  H  09/21/18  08:20    


 


INR  1.20   09/21/18  08:20    


 


APTT  28.9 Seconds (25.1-36.5)   09/21/18  08:20    














- Constitutional


Appears: No Acute Distress





- Eye Exam


Eye Exam: Normal appearance





- ENT Exam


ENT Exam: Mucous Membranes Moist





- Respiratory Exam


Respiratory Exam: Decreased Breath Sounds, NORMAL BREATHING PATTERN





- GI/Abdominal Exam


GI & Abdominal Exam: Soft, Normal Bowel Sounds





- Extremities Exam


Extremities Exam: Normal Capillary Refill





- Neurological Exam


Neurological Exam: Alert, Awake, Oriented x3





- Psychiatric Exam


Psychiatric exam: Normal Affect





- Skin


Skin Exam: Dry, Warm





Assessment and Plan





- Assessment and Plan (Free Text)


Assessment: 





A 78 year old  male who came in to the ER due to abdominal pain. Work up showed 

gallstones. History of aortic valve replacement (2009) hyperlipidemia, 

hypertension,GERD. Status post ERCP and sphincterotomy and stone extraction 

last Friday. Moderate to high risk for surgery however no absolute 

contraindication to undergo procedure. No evidence of heart failure or coronary 

ischemia. Cleared for surgery from cardiac standpoint. For possible 

cholecystectomy Monday.


Plan: 





Denies chest pain


For cholecystectomy Monday by Dr. Lemus


Cleared for surgery from cardiac standpoint for surgery


Controlled blood pressure


Heart rate stable


Hydralazine IV PRN for elevated blood pressure


Started on low dose Lopressor yesterday


Coughing, started on Tessalon Perles PRN


Replenished potassium


Continue IV antibiotics per ID


Continue current medications


Continue current treatment





Will follow up postoperatively





Plan and treatment discussed with Dr. Ramos

## 2018-09-23 NOTE — CP.PCM.PN
<Sung Kim - Last Filed: 09/23/18 14:12>





Subjective





- Date & Time of Evaluation


Date of Evaluation: 09/23/18


Time of Evaluation: 07:00





- Subjective


Subjective: 





Sung Kim PGY1 Medicine Progress Note for Dr. Peng





Patient was seen and examined at bedside this morning. Resting comfortably in 

bed. Patient denies cp, sob, abdominal pain, nausea, vomiting, diarrhea. No 

overnight changes. BP has been in the 150s/70s overnight. Afebrile. 





A full 12 point ROS was conducted and unremarkable except as stated above.


  





Objective





- Vital Signs/Intake and Output


Vital Signs (last 24 hours): 


 











Temp Pulse Resp BP Pulse Ox


 


 97.7 F   61   20   160/87 H  97 


 


 09/23/18 08:25  09/23/18 11:11  09/23/18 08:25  09/23/18 11:11  09/23/18 08:25











- Medications


Medications: 


 Current Medications





Atorvastatin Calcium (Lipitor)  20 mg PO DIN [facility]


   Last Admin: 09/22/18 17:01 Dose:  20 mg


Benzonatate (Tessalon Perles)  100 mg PO Q8 [facility]


   Last Admin: 09/23/18 08:10 Dose:  100 mg


Hydralazine HCl (Apresoline)  10 mg IVP Q6 PRN


   PRN Reason: elevated blood pressure


Piperacillin Sod/Tazobactam Sod (Zosyn 3.375 In Ns 100ml)  100 mls @ 200 mls/hr 

IVPB Q6 [facility]


   PRN Reason: Protocol


   Stop: 09/27/18 12:01


   Last Admin: 09/23/18 11:12 Dose:  200 mls/hr


Sodium Chloride (Sodium Chloride 0.9%)  1,000 mls @ 100 mls/hr IV .Q10H [facility]


   Last Admin: 09/23/18 11:12 Dose:  100 mls/hr


Metoprolol Tartrate (Lopressor)  25 mg PO BID [facility]


   Last Admin: 09/23/18 11:11 Dose:  25 mg


Ondansetron HCl (Zofran Inj)  4 mg IVP Q6H PRN


   PRN Reason: Nausea/Vomiting


   Last Admin: 09/20/18 07:22 Dose:  4 mg


Pantoprazole Sodium (Protonix Ec Tab)  40 mg PO 0600 [facility]


   Last Admin: 09/23/18 05:05 Dose:  40 mg











- Labs


Labs: 


 





 09/23/18 07:30 





 09/23/18 07:30 





 











PT  13.8 SECONDS (9.4-12.5)  H  09/21/18  08:20    


 


INR  1.20   09/21/18  08:20    


 


APTT  28.9 Seconds (25.1-36.5)   09/21/18  08:20    














- Constitutional


Appears: No Acute Distress





- Head Exam


Head Exam: ATRAUMATIC, NORMAL INSPECTION, NORMOCEPHALIC





- Eye Exam


Eye Exam: EOMI, Normal appearance, PERRL


Pupil Exam: NORMAL ACCOMODATION, PERRL





- ENT Exam


ENT Exam: Mucous Membranes Moist, Normal Exam





- Neck Exam


Neck Exam: Full ROM, Normal Inspection.  absent: Lymphadenopathy





- Respiratory Exam


Respiratory Exam: Clear to Ausculation Bilateral, NORMAL BREATHING PATTERN





- Cardiovascular Exam


Cardiovascular Exam: REGULAR RHYTHM, +S1, +S2.  absent: Murmur





- GI/Abdominal Exam


GI & Abdominal Exam: Soft, Normal Bowel Sounds.  absent: Tenderness





- Extremities Exam


Extremities Exam: Full ROM, Normal Capillary Refill, Normal Inspection.  absent

: Joint Swelling, Pedal Edema





- Back Exam


Back Exam: NORMAL INSPECTION





- Neurological Exam


Neurological Exam: Alert, Awake, CN II-XII Intact, Oriented x3


Neuro motor strength exam: Left Upper Extremity: 5, Right Upper Extremity: 5, 

Left Lower Extremity: 5, Right Lower Extremity: 5





- Psychiatric Exam


Psychiatric exam: Normal Affect, Normal Mood





- Skin


Skin Exam: Dry, Intact, Normal Color, Warm





Assessment and Plan





- Assessment and Plan (Free Text)


Assessment: 





Patient is a 78 year old male with a past medical history significant for GERD, 

HTN, HLD, AS s/p bioprosthetic AVR (2009), inguinal hernia s/p LIHR who 

presented with RUQ pain for several days. On imaging, patient was found to have 

gallstones with biliary ductal dilation. MRCP showed a 3mm stone in the CBD. 

Surgery and GI are following the case. Patient was admitted for 

choledocholithiasis and had successful removal of stone via ERCP. Patient is 

planned for lap hemal on Monday, 9/24.   


Plan: 





RUQ Abdominal Pain 2/2 Choledocholithiasis 


- Planned for Lap Cholecystecomy on Monday, 9/24


- c/w zosyn (ID recs appreciated)


- NPO after midnight


- ERCP (9/21): esophageal mucosal changes suspicious for short segment Barretts 

esophagus, biopsies taken at the distal esophagus. Choledocholithiasis was 

found. Complete removal accomplished by biliary sphincterotomy and balloon 

extraction. 


- Blood Cx positive for gram + cocci; 2nd Blood Cx was negative. Possible 

contamination. Repeat blood Cx was negative. 


- MRCP (9/20): 3 mm stone in the CBD. CBD dilated to 6mm. Multiple large 

gallstones seen. 


- Abdominal US (9/19): gallstones with wall edema and no sludge


- CT abd/pelvis (9/19): mild intrahepatic biliary ductal dilation with 

gallstones


- Surgery consulted. Recs appreciated.


- Cardio consulted. Patient is cleared. 





HTN 


- BP is in 150s/70s; patient is asymptomatic 


- SBP 180s-190s s/p ERCP (9/21)


- c/w hydralazine 10 mg IVP q6 PRN 


- c/w metoprolol 25 mg PO BID 





Transaminitis 2/2 Choledocholithiasis - improving


- AST/ALT (trending downwards)


- T. Bili and ALP (trending downwards)


- continue to monitor LFTs 


- GI consulted. Recs appreciated. 





GERD


-Pending biopsy results from ERCP - suspicious for Barretts esophagus 


-protonix





HLD


-c/w atorvastatin





DVT ppx: SCD


GI ppx: Protonix 





Dispo: Patient is being monitored on the floor. Planned for lap hemal on Monday

, 9/24. 





Case was discussed and reviewed with Attending Physician, Dr. Peng. 





<Dima Peng - Last Filed: 09/23/18 14:49>





Objective





- Vital Signs/Intake and Output


Vital Signs (last 24 hours): 


 











Temp Pulse Resp BP Pulse Ox


 


 97.7 F   61   20   160/87 H  97 


 


 09/23/18 08:25  09/23/18 11:11  09/23/18 08:25  09/23/18 11:11  09/23/18 08:25











- Medications


Medications: 


 Current Medications





Atorvastatin Calcium (Lipitor)  20 mg PO DIN [facility]


   Last Admin: 09/22/18 17:01 Dose:  20 mg


Benzonatate (Tessalon Perles)  100 mg PO Q8 [facility]


   Last Admin: 09/23/18 08:10 Dose:  100 mg


Hydralazine HCl (Apresoline)  10 mg IVP Q6 PRN


   PRN Reason: elevated blood pressure


Piperacillin Sod/Tazobactam Sod (Zosyn 3.375 In Ns 100ml)  100 mls @ 200 mls/hr 

IVPB Q6 [facility]


   PRN Reason: Protocol


   Stop: 09/27/18 12:01


   Last Admin: 09/23/18 11:12 Dose:  200 mls/hr


Sodium Chloride (Sodium Chloride 0.9%)  1,000 mls @ 100 mls/hr IV .Q10H [facility]


   Last Admin: 09/23/18 11:12 Dose:  100 mls/hr


Metoprolol Tartrate (Lopressor)  25 mg PO BID [facility]


   Last Admin: 09/23/18 11:11 Dose:  25 mg


Ondansetron HCl (Zofran Inj)  4 mg IVP Q6H PRN


   PRN Reason: Nausea/Vomiting


   Last Admin: 09/20/18 07:22 Dose:  4 mg


Pantoprazole Sodium (Protonix Ec Tab)  40 mg PO 0600 [facility]


   Last Admin: 09/23/18 05:05 Dose:  40 mg











- Labs


Labs: 


 





 09/23/18 07:30 





 09/23/18 07:30 





 











PT  13.8 SECONDS (9.4-12.5)  H  09/21/18  08:20    


 


INR  1.20   09/21/18  08:20    


 


APTT  28.9 Seconds (25.1-36.5)   09/21/18  08:20    














Attending/Attestation





- Attestation


I have personally seen and examined this patient.: Yes


I have fully participated in the care of the patient.: Yes


I have reviewed all pertinent clinical information, including history, physical 

exam and plan: Yes


Notes (Text): 





09/23/18 14:48








Attending  note;





Patient seen and examined with the resident.


Patient is alert and awake.


Denies any abdominal pain.


Denies any nausea, vomiting.


s/p ERCP/ EUS with sphinterotomy and cbd stone removal.





Patient is a 78 year old male with a past medical history significant for GERD, 

HTN, HLD, AS s/p bioprosthetic AVR (2009), inguinal hernia is admitted with 

right upper quadrant pain.





patient was found to have gallstones with biliary ductal dilation. MRCP showed 

a 3mm stone in the CBD.


Choledocholithiasis.


s/p EUS and ERCP with sphincterotomy and stone removal. EUS showed short 

segment Fong's esophagus. Follow up with GI as outpatient closely.





Patient is pain-free.


LFT improved significantly.


Started on clear liquid diet.





Surgery evaluation appreciated. Plan for laparoscopic cholecystectomy  tomorrow.





Blood culture1/2 positive for gram-positive cocci. Patient is afebrile and 

nontoxic.


Continue IV vancomycin and Zosyn. 


Blood cultures positive for strep viridans in one bottle.


Repeat blood culture is negative. Patient is afebrile and nontoxic.


ID evaluation appreciated.





Patient  with a history of bioprosthetic aoitic valve replacement in 2009. 

Monitor closely.


Cardiology evaluation appreciated. 


Hypertension; started on IV hydralazine. Blood pressure is better controlled.





nothing by mouth after midnight for laparoscopic cholecystectomy.





Upon discharge patient will follow-up with PMD Dr. Quick.

## 2018-09-23 NOTE — CP.PCM.PN
Subjective





- Date & Time of Evaluation


Date of Evaluation: 09/23/18


Time of Evaluation: 08:10





- Subjective


Subjective: 


General Surgery


Dr. Valdovinos





Pt S&E @bedside. No acute events overnight. no complaints this AM. tolerating 

CLD. denies F/C, N/V, D/C. 





Objective





- Vital Signs/Intake and Output


Vital Signs (last 24 hours): 


 











Temp Pulse Resp BP Pulse Ox


 


 97.6 F   52 L  16   152/79 H  99 


 


 09/22/18 22:53  09/22/18 22:53  09/22/18 22:53  09/22/18 17:01  09/22/18 22:53











- Medications


Medications: 


 Current Medications





Atorvastatin Calcium (Lipitor)  20 mg PO DIN [facility]


   Last Admin: 09/22/18 17:01 Dose:  20 mg


Benzonatate (Tessalon Perles)  100 mg PO Q8 [facility]


Hydralazine HCl (Apresoline)  10 mg IVP Q6 PRN


   PRN Reason: elevated blood pressure


Piperacillin Sod/Tazobactam Sod (Zosyn 3.375 In Ns 100ml)  100 mls @ 200 mls/hr 

IVPB Q6 [facility]


   PRN Reason: Protocol


   Stop: 09/27/18 12:01


   Last Admin: 09/23/18 05:04 Dose:  200 mls/hr


Vancomycin HCl (Vancomycin 1gm)  1 gm in 250 mls @ 167 mls/hr IVPB Q12H [facility]


   PRN Reason: Protocol


   Last Admin: 09/22/18 21:25 Dose:  167 mls/hr


Sodium Chloride (Sodium Chloride 0.9%)  1,000 mls @ 100 mls/hr IV .Q10H [facility]


   Last Admin: 09/23/18 05:06 Dose:  100 mls/hr


Metoprolol Tartrate (Lopressor)  25 mg PO BID [facility]


   Last Admin: 09/22/18 17:01 Dose:  25 mg


Ondansetron HCl (Zofran Inj)  4 mg IVP Q6H [facility]


   PRN Reason: Nausea/Vomiting


   Last Admin: 09/20/18 07:22 Dose:  4 mg


Pantoprazole Sodium (Protonix Ec Tab)  40 mg PO 0600 [facility]


   Last Admin: 09/23/18 05:05 Dose:  40 mg











- Labs


Labs: 


AM Labs pending 





 09/22/18 07:30 





 09/22/18 07:30 





 











PT  13.8 SECONDS (9.4-12.5)  H  09/21/18  08:20    


 


INR  1.20   09/21/18  08:20    


 


APTT  28.9 Seconds (25.1-36.5)   09/21/18  08:20    














- Constitutional


Appears: Non-toxic, No Acute Distress





- Head Exam


Head Exam: NORMAL INSPECTION





- Eye Exam


Eye Exam: Normal appearance





- ENT Exam


ENT Exam: Mucous Membranes Moist





- Respiratory Exam


Respiratory Exam: NORMAL BREATHING PATTERN.  absent: Accessory Muscle Use, 

Respiratory Distress





- Cardiovascular Exam


Cardiovascular Exam: REGULAR RHYTHM.  absent: Bradycardia, Tachycardia





- GI/Abdominal Exam


GI & Abdominal Exam: Soft.  absent: Distended, Guarding, Tenderness, Rebound





- Extremities Exam


Extremities Exam: Normal Inspection





- Neurological Exam


Neurological Exam: Alert, Awake, Oriented x3





- Psychiatric Exam


Psychiatric exam: Normal Affect, Normal Mood





- Skin


Skin Exam: Dry, Intact, Normal Color, Warm





Assessment and Plan





- Assessment and Plan (Free Text)


Assessment: 





77 y/o M w/ choledocholithiasis s/p ERCP





- plan for OR Monday, 9/24, @930am


- f/u cards recs


- f/u AM labs


- cont CLD


- NPO@MN


- cont IV Abx per ID


- cont medical managemenr


- encourage OOB to chair/Amb/IS use





Pt discussed w/ Dr. Bonifacio Wakefield DO PGY3

## 2018-09-24 LAB
ALBUMIN SERPL-MCNC: 3.3 G/DL (ref 3–4.8)
ALBUMIN/GLOB SERPL: 1.1 {RATIO} (ref 1.1–1.8)
ALT SERPL-CCNC: 192 U/L (ref 7–56)
AST SERPL-CCNC: 70 U/L (ref 17–59)
BASOPHILS # BLD AUTO: 0.03 K/MM3 (ref 0–2)
BASOPHILS NFR BLD: 0.6 % (ref 0–3)
BUN SERPL-MCNC: 5 MG/DL (ref 7–21)
CALCIUM SERPL-MCNC: 8.5 MG/DL (ref 8.4–10.5)
EOSINOPHIL # BLD: 0.4 10*3/UL (ref 0–0.7)
EOSINOPHIL NFR BLD: 6.8 % (ref 1.5–5)
ERYTHROCYTE [DISTWIDTH] IN BLOOD BY AUTOMATED COUNT: 14.2 % (ref 11.5–14.5)
GFR NON-AFRICAN AMERICAN: > 60
GRANULOCYTES # BLD: 3.21 10*3/UL (ref 1.4–6.5)
GRANULOCYTES NFR BLD: 62.7 % (ref 50–68)
HGB BLD-MCNC: 11.8 G/DL (ref 14–18)
LYMPHOCYTES # BLD: 0.9 10*3/UL (ref 1.2–3.4)
LYMPHOCYTES NFR BLD AUTO: 18 % (ref 22–35)
MCH RBC QN AUTO: 27.8 PG (ref 25–35)
MCHC RBC AUTO-ENTMCNC: 32.3 G/DL (ref 31–37)
MCV RBC AUTO: 86.1 FL (ref 80–105)
MONOCYTES # BLD AUTO: 0.6 10*3/UL (ref 0.1–0.6)
MONOCYTES NFR BLD: 11.9 % (ref 1–6)
PLATELET # BLD: 129 10^3/UL (ref 120–450)
PMV BLD AUTO: 11.2 FL (ref 7–11)
RBC # BLD AUTO: 4.24 10^6/UL (ref 3.5–6.1)
WBC # BLD AUTO: 5.1 10^3/UL (ref 4.5–11)

## 2018-09-24 RX ADMIN — PIPERACILLIN AND TAZOBACTAM SCH MLS/HR: 3; .375 INJECTION, POWDER, LYOPHILIZED, FOR SOLUTION INTRAVENOUS; PARENTERAL at 05:34

## 2018-09-24 RX ADMIN — PIPERACILLIN AND TAZOBACTAM SCH MLS/HR: 3; .375 INJECTION, POWDER, LYOPHILIZED, FOR SOLUTION INTRAVENOUS; PARENTERAL at 17:18

## 2018-09-24 RX ADMIN — PIPERACILLIN AND TAZOBACTAM SCH MLS/HR: 3; .375 INJECTION, POWDER, LYOPHILIZED, FOR SOLUTION INTRAVENOUS; PARENTERAL at 13:18

## 2018-09-24 RX ADMIN — PANTOPRAZOLE SODIUM SCH MG: 40 TABLET, DELAYED RELEASE ORAL at 05:34

## 2018-09-24 NOTE — PN
Copied To:  Kane Ramos MD

Attending MD:  Kane Ramos MD



DATE:  09/22/2018



LOCATION:  The patient in room 573, bed 3.



This is an addendum to a Progress Note written by CLEM Prado.



The patient admitted with acute cholecystitis and found to have gallstones

as well as stone in the common bile duct.  The patient went for ERCP

yesterday and had sphincterotomy also done and had gastroendoscopy with

biopsy.  The patient has history of aortic valve replacement and from

clinically cardiac point of view, he is asymptomatic.  He is lying flat in

bed without any cardiac symptoms.  The patient has been clinically cardiac

status stable.  Initial x-ray was portable and it was read as congestive

changes, but clinically the patient did not have any congestive symptoms,

so repeat x-ray was done PA and lateral and that has no evidence of CHF. 

The patient underwent ERCP without any complications and any cardiac

symptoms.  Now, the patient is needing gallbladder surgery from cardiac

point of view.  He can go as a moderate risk.  The patient already has been

on antibiotics, piperacillin and tazobactam 100 mL IV every 6 hours and he

has been getting Protonix IV daily.  He was getting also 75 mL an hour IV

fluid.  We will also add Lopressor 25 b.i.d. now to the therapy.  We will

follow.







__________________________________________

Kane Ramos MD



DD:  09/22/2018 11:47:44

DT:  09/22/2018 12:50:40

Job # [id number]

## 2018-09-24 NOTE — CP.PCM.PN
Subjective





- Date & Time of Evaluation


Date of Evaluation: 09/24/18


Time of Evaluation: 06:50





- Subjective


Subjective: 


General Surgery progress note for Dr. Valdovinos


Patient seen and examined at bedside this AM. No adverse events overnight. 

Patient denies any nausea, vomiting, diarrhea, fevers, chills, and pain is 

improving, well controlled on current regimen.





Objective





- Vital Signs/Intake and Output


Vital Signs (last 24 hours): 


                                        











Temp Pulse Resp BP Pulse Ox


 


 97.9 F   59 L  18   166/89 H  98 


 


 09/24/18 07:00  09/24/18 09:22  09/24/18 07:00  09/24/18 09:22  09/24/18 07:00











- Medications


Medications: 


                               Current Medications





Atorvastatin Calcium (Lipitor)  20 mg PO DIN [facility]


   Last Admin: 09/23/18 17:41 Dose:  20 mg


Benzonatate (Tessalon Perles)  100 mg PO Q8 [facility]


   Last Admin: 09/24/18 05:34 Dose:  100 mg


Hydralazine HCl (Apresoline)  10 mg IVP Q6 PRN


   PRN Reason: elevated blood pressure


   Last Admin: 09/23/18 15:48 Dose:  10 mg


Piperacillin Sod/Tazobactam Sod (Zosyn 3.375 In Ns 100ml)  100 mls @ 200 mls/hr 

IVPB Q6 [facility]


   PRN Reason: Protocol


   Stop: 09/27/18 12:01


   Last Admin: 09/24/18 05:34 Dose:  200 mls/hr


Sodium Chloride (Sodium Chloride 0.9%)  1,000 mls @ 100 mls/hr IV .Q10H [facility]


   Last Admin: 09/24/18 09:22 Dose:  100 mls/hr


Metoprolol Tartrate (Lopressor)  25 mg PO BID [facility]


   Last Admin: 09/24/18 09:22 Dose:  25 mg


Ondansetron HCl (Zofran Inj)  4 mg IVP Q6H [facility]N


   PRN Reason: Nausea/Vomiting


   Last Admin: 09/20/18 07:22 Dose:  4 mg


Pantoprazole Sodium (Protonix Ec Tab)  40 mg PO 0600 [facility]


   Last Admin: 09/24/18 05:34 Dose:  40 mg











- Labs


Labs: 


                                        





                                 09/24/18 06:30 





                                 09/24/18 06:30 





                                        











PT  13.8 SECONDS (9.4-12.5)  H  09/21/18  08:20    


 


INR  1.20   09/21/18  08:20    


 


APTT  28.9 Seconds (25.1-36.5)   09/21/18  08:20    














- Constitutional


Appears: Well, Non-toxic, No Acute Distress





- Head Exam


Head Exam: ATRAUMATIC, NORMOCEPHALIC





- Eye Exam


Eye Exam: Normal appearance.  absent: Conjunctival injection, Scleral icterus





- ENT Exam


ENT Exam: Mucous Membranes Moist, Normal Oropharynx





- Respiratory Exam


Respiratory Exam: NORMAL BREATHING PATTERN.  absent: Accessory Muscle Use, 

Respiratory Distress





- Cardiovascular Exam


Cardiovascular Exam: RRR





- GI/Abdominal Exam


GI & Abdominal Exam: Soft.  absent: Distended, Tenderness





- Extremities Exam


Extremities Exam: absent: Calf Tenderness, Pedal Edema, Tenderness


Additional comments: 





mild RUQ tenderness





- Neurological Exam


Neurological Exam: Alert, Awake, Oriented x3





- Psychiatric Exam


Psychiatric exam: Normal Affect, Normal Mood





- Skin


Skin Exam: Dry, Normal Color, Warm





Assessment and Plan





- Assessment and Plan (Free Text)


Assessment: 





78


Plan: 





No OR today. Patient has positive blood cultures x1 for strep viridans, ID c

oncerned for endocarditis considering bioprosthetic valve. Will postpone OR for 

today pending ECHO. Tentative schedule for OR tomorrow .


Continue to trend CBC and CMP


F/U ID recs


F/U ECHO


Pt may be restarted on CLD from a surgical standpoint if compatible with 

ID/cardio work up


Continue IV abx per ID


F/U cardiology recs--recommend tighter control of BP





Seen and examined with Dr. Valdovinos, who agrees with above


Paola Moya, PGY2

## 2018-09-24 NOTE — CP.PCM.PN
<Daysi Anderson - Last Filed: 09/24/18 18:33>





Subjective





- Date & Time of Evaluation


Date of Evaluation: 09/24/18


Time of Evaluation: 07:07





- Subjective


Subjective: 





PGY-1 Daysi Anderson D.O. Medicine progress note for Dr. Valdovinos's service:





Patient was seen and examined this morning. Patient's lap choly was 

cancelled/post-poned due to 1/2 positive Blood culture for strep viridans. 

Patient has no acute complaints. His abdominal pain is well controlled without 

any medications. He denies chest pain, SOB. He is eating and sleeping well. 

Patient was NPO after midnight, but is now on liquid diet pending rescheduling 

of surgery.








Objective





- Vital Signs/Intake and Output


Vital Signs (last 24 hours): 


                                        











Temp Pulse Resp BP Pulse Ox


 


 98.2 F   62   18   154/80 H  99 


 


 09/24/18 14:00  09/24/18 14:00  09/24/18 14:00  09/24/18 14:00  09/24/18 14:00











- Medications


Medications: 


                               Current Medications





Atorvastatin Calcium (Lipitor)  20 mg PO DIN [facility]


   Last Admin: 09/23/18 17:41 Dose:  20 mg


Benzonatate (Tessalon Perles)  100 mg PO Q8 [facility]


   Last Admin: 09/24/18 13:18 Dose:  100 mg


Hydralazine HCl (Apresoline)  10 mg IVP Q6 PRN


   PRN Reason: elevated blood pressure


   Last Admin: 09/23/18 15:48 Dose:  10 mg


Piperacillin Sod/Tazobactam Sod (Zosyn 3.375 In Ns 100ml)  100 mls @ 200 mls/hr 

IVPB Q6 [facility]


   PRN Reason: Protocol


   Stop: 09/27/18 12:01


   Last Admin: 09/24/18 13:18 Dose:  200 mls/hr


Sodium Chloride (Sodium Chloride 0.9%)  1,000 mls @ 100 mls/hr IV .Q10H [facility]


   Last Admin: 09/24/18 09:22 Dose:  100 mls/hr


Metoprolol Tartrate (Lopressor)  25 mg PO BID [facility]


   Last Admin: 09/24/18 09:22 Dose:  25 mg


Ondansetron HCl (Zofran Inj)  4 mg IVP Q6H PRN


   PRN Reason: Nausea/Vomiting


   Last Admin: 09/20/18 07:22 Dose:  4 mg


Pantoprazole Sodium (Protonix Ec Tab)  40 mg PO 0600 [facility]


   Last Admin: 09/24/18 05:34 Dose:  40 mg











- Labs


Labs: 


                                        





                                 09/24/18 06:30 





                                 09/24/18 06:30 





                                        











PT  13.8 SECONDS (9.4-12.5)  H  09/21/18  08:20    


 


INR  1.20   09/21/18  08:20    


 


APTT  28.9 Seconds (25.1-36.5)   09/21/18  08:20    














- Constitutional


Appears: Well





- Head Exam


Head Exam: ATRAUMATIC, NORMAL INSPECTION, NORMOCEPHALIC





- Eye Exam


Eye Exam: EOMI, Normal appearance, PERRL





- ENT Exam


ENT Exam: Mucous Membranes Moist, Normal Exam





- Neck Exam


Neck Exam: Full ROM, Normal Inspection.  absent: Lymphadenopathy





- Respiratory Exam


Respiratory Exam: Clear to Ausculation Bilateral, NORMAL BREATHING PATTERN





- Cardiovascular Exam


Cardiovascular Exam: REGULAR RHYTHM, +S1, +S2, Murmur





- GI/Abdominal Exam


GI & Abdominal Exam: Soft, Normal Bowel Sounds.  absent: Tenderness





- Rectal Exam


Rectal Exam: Deferred





- Extremities Exam


Extremities Exam: Full ROM, Normal Capillary Refill, Normal Inspection.  absent:

Joint Swelling, Pedal Edema





- Back Exam


Back Exam: NORMAL INSPECTION





- Neurological Exam


Neurological Exam: Alert, Awake, CN II-XII Intact, Oriented x3


Neuro motor strength exam: Left Upper Extremity: 5, Right Upper Extremity: 5, 

Left Lower Extremity: 5, Right Lower Extremity: 5





- Psychiatric Exam


Psychiatric exam: Normal Affect, Normal Mood





- Skin


Skin Exam: Dry, Intact, Normal Color, Warm





Assessment and Plan





- Assessment and Plan (Free Text)


Assessment: 





Patient is a 78 year old male with a past medical history significant for GERD, 

HTN, HLD, AS s/p bioprosthetic AVR (2009), inguinal hernia s/p LIHR who 

presented with RUQ pain for several days. On imaging, patient was found to have 

gallstones with biliary ductal dilation. MRCP showed a 3mm stone in the CBD. 

Surgery and GI are following the case. Patient was admitted for 

choledocholithiasis and had successful removal of stone via ERCP. Patient lap 

choly is postposed due to 1/2 positive blood cultures. Reschedule pending echo 

read (no vegetations). 





Plan: 





Choledocholithiasis s/p ERCP- lap cholecystecomy pending negative echo and 

repeat blood Cx


   - Abdominal US (9/19): gallstones with wall edema and no sludge


   - CT abd/pelvis (9/19): mild intrahepatic biliary ductal dilation with 

gallstones


   - MRCP (9/20): 3 mm stone in the CBD. CBD dilated to 6mm. Multiple large 

gallstones seen. 


   - ERCP (9/21): esophageal mucosal changes suspicious for short segment [place]

tts esophagus, biopsies taken at the distal esophagus.    


   Choledocholithiasis was found. Complete removal accomplished by biliary 

sphincterotomy and balloon extraction.


   - Blood Cx 1/2 positive for Strep viridan; likely contamination. 


   - Repeat blood Cx negative


   - Zosyn 3.375 mg IV Q6H (started 9/20)


   - Zofran 4 mg IV Q6H PRN


   - Surgery consulted (Bonifacio)


   - Cardio consulted (Bryson)- previously cleared, pending echo read





HTN 


   - Hydralazine 10 mg IVP Q6H PRN 


   - Metoprolol 25 mg PO BID 





Transaminitis, improving- likely 2/2 choledocholithiasis


   - AST/ALT (trending downwards)


   - T. Bili and ALP (trending downwards)


   - Monitor daily


   - GI consulted (Mariah)





GERD


   - Pending biopsy results from ERCP (suspicious for Fong's esophagus)


   - Protonix 40 mg PO daily





HLD


   - Lipitor 20 mg PO QHS





IVF: LR @ 100


Diet: full liquids- NPO at midnight


DVT ppx: SCD


GI ppx: Protonix 40 mg PO daily


Code status: full code





Case discussed with attending, Dr. Valdovinos.








<Lazara Valdovinos R - Last Filed: 09/25/18 17:10>





Objective





- Vital Signs/Intake and Output


Vital Signs (last 24 hours): 


                                        











Temp Pulse Resp BP Pulse Ox


 


 97.8 F   62   20   137/71   100 


 


 09/25/18 14:00  09/25/18 14:00  09/25/18 14:00  09/25/18 14:00  09/25/18 14:00








Intake and Output: 


                                        











 09/25/18 09/25/18





 06:59 18:59


 


Intake Total 480 


 


Balance 480 














- Medications


Medications: 


                               Current Medications





Atorvastatin Calcium (Lipitor)  20 mg PO DIN [facility]


   Last Admin: 09/24/18 17:17 Dose:  20 mg


Benzonatate (Tessalon Perles)  100 mg PO Q8 [facility]


   Last Admin: 09/25/18 15:41 Dose:  100 mg


Hydralazine HCl (Apresoline)  10 mg IVP Q6 PRN


   PRN Reason: elevated blood pressure


   Last Admin: 09/23/18 15:48 Dose:  10 mg


Piperacillin Sod/Tazobactam Sod (Zosyn 3.375 In Ns 100ml)  100 mls @ 200 mls/hr 

IVPB Q6 [facility]; Protocol


   Stop: 09/27/18 12:01


   Last Admin: 09/25/18 11:54 Dose:  200 mls/hr


Lactated Ringer's (Lactated Ringer's)  1,000 mls @ 100 mls/hr IV .Q10H [facility]


   Last Admin: 09/25/18 06:14 Dose:  100 mls/hr


Metoprolol Tartrate (Lopressor)  25 mg PO BID [facility]


   Last Admin: 09/25/18 09:21 Dose:  25 mg


Ondansetron HCl (Zofran Inj)  4 mg IVP Q6H PRN


   PRN Reason: Nausea/Vomiting


   Last Admin: 09/20/18 07:22 Dose:  4 mg


Pantoprazole Sodium (Protonix Ec Tab)  40 mg PO 0600 [facility]


   Last Admin: 09/25/18 06:12 Dose:  40 mg











- Labs


Labs: 


                                        





                                 09/25/18 07:15 





                                 09/25/18 07:15 





                                        











PT  12.1 SECONDS (9.4-12.5)   09/25/18  07:15    


 


INR  1.05   09/25/18  07:15    


 


APTT  28.3 Seconds (25.1-36.5)   09/25/18  07:15    














Attending/Attestation





- Attestation


I have personally seen and examined this patient.: Yes


I have fully participated in the care of the patient.: Yes


I have reviewed all pertinent clinical information, including history, physical 

exam and plan: Yes


Notes (Text): 


Patient seen and examined by me at 12:05PM with resident 9/24/18. Case including

HPI, physical exam, and assessment and plan discussed with resident. Agree with 

above with following additions/corrections.


Patient is a 78-year-old male with past medical history significant for 

hyperlipidemia, GERD, valve replacement 2009, and gallstones that presented to 

emergency room with 3 day history of right-sided abdominal pain.


Patient states he is feeling ok. States he is tired of eating liquid diet and 

wants a regular diet. States he has some right upper quadrant abdominal pain but

it has improved. Pain is not associated with eating. No associated nausea or 

vomiting. No radiation of the pain. He denies any chest pain or shortness of 

breath. No headaches or dizziness. No fevers or chills. No diarrhea or 

constipation. No dysuria. 


Physical exam:


General: Awake and alert sitting up in bed in no acute distress


HEENT: Normocephalic atraumatic. Pupils equal reactive. No scleral icterus. 

Oropharynx is pink and moist. Neck is supple.


Cardiovascular: Normal rhythm. Normal S1, S2. Positive systolic murmur. No rubs 

or gallops appreciated 


Pulmonary: Normal respiratory effort. No rhonchi, rales or wheezing appreciated.




Gastrointestinal: Soft, nondistended. Positive right upper quadrant tenderness 

with deep palpation. Positive bowel sounds all 4 quadrants, no guarding. 


Musculoskeletal: Moves all extremities, no calf tenderness, no edema 

appreciated. 


Central nervous system: AAOx 3, CN 2-12 grossly intact


Dermatologic:  Skin warm and dry. 


Assessment and plan: Patient is a 78-year-old male with past medical history 

significant for hyperlipidemia, GERD, valve replacement 2009, and gallstones 

that presented to emergency room with 3 day history of right-sided abdominal 

pain.


1.  Choledocholithiasis. CT abdomen and pelvis per radiologist showed 

gallstones, mild intrahepatic ductal dilatation. Gallbladder ultrasound per 

radiology shows liver is mildly enlarged measuring 17.1 cm in length, there is 

diffuse increase in hepatic parenchymal echogenicity consistent with fatty 

infiltration, there are no focal lesions present, there is mild dilatation of 

the intrahepatic biliary ducts; multiple echogenic shadowing gallstones and 

echogenic sludge are noted in the partially distended gallbladder with diffuse 5

mm wall thickening, no sonographic Connelly sign seen; the CBD is mildly dilated 

measuring 7 mm, no discrete CBD stone is seen. MRCP per radiologist shows small 

3 mm stone in the distal common duct, common duct measures 6 mm in diameter, 

multiple large gallstones are seen. Status post ERCP 09/21/2018 with removal of 

stone with balloon extraction, biliary sphincterotomy. ERCP also  showed 

esophageal mucosal changes suspicious for short segment Fong's esophagus. 

Follow-up biopsy. Patient to have cholecystectomy. Surgery held for now 

secondary to one blood culture being positive for strep Viridans. 2-D echo is 

results  pending. Cardiology following, patient cleared by cardiology for 

surgery.


2. Hypertension. Continue metoprolol. Continue hydralazine every 6 hours as 

needed.


3. Blood culture positive. One blood culture positive for strep viridans. 2-D 

echo pending. Repeat blood cultures with no growth. ID following, 

recommendations appreciated. Continue with Zosyn.


4. Transaminitis. Likely secondary to #1. Downtrending. Continue to monitor.


5. Hyperlipidemia. Continue Lipitor.


6. GERD. Possible Fong's esophagus. Pending biopsy results. Continue 

Protonix.


7. GI and DVT prophylaxis. Protonix and SCDs with early ambulation


8. Patient is a full code


Case was discussed in detail with the patient regarding current diagnosis and 

treatment plan. All questions answered.

## 2018-09-24 NOTE — PN
DATE:  09/23/2018



ADDENDUM



This addendum is to a Progress Note written by CLEM Prado



LOCATION:  The patient is in room 573, bed 3.



The patient was admitted with cholecystitis, found to have stone in the

common bile duct and gallstones.  The patient is scheduled for GB surgery

tomorrow.  The patient denies any chest pain, shortness of breath or

palpitation.  He has cough during the night.  The patient's potassium

yesterday was 3.5.  I am going to give him K-Dur 40 p.o. today.  I will

repeat electrolytes, BUN tomorrow morning.  In the meantime, clinically,

the patient's cardiac status is stable.  We already put him on Lopressor

b.i.d. and the patient can go for surgery as a moderate risk.  We will

continue to follow.





__________________________________________

Kane Ramos MD



DD:  09/23/2018 8:52:02

DT:  09/23/2018 9:58:03

Job # [id number]

## 2018-09-24 NOTE — CP.PCM.PN
Subjective





- Date & Time of Evaluation


Date of Evaluation: 09/24/18


Time of Evaluation: 13:45





- Subjective


Subjective: 





No fevers, no abdominal pain currently, patient is planned for surgery.





Objective





- Vital Signs/Intake and Output


Vital Signs (last 24 hours): 


 











Temp Pulse Resp BP Pulse Ox


 


 98.2 F   62   18   154/80 H  99 


 


 09/24/18 14:00  09/24/18 17:17  09/24/18 14:00  09/24/18 17:17  09/24/18 14:00











- Medications


Medications: 


 Current Medications





Atorvastatin Calcium (Lipitor)  20 mg PO DIN [facility]


   Last Admin: 09/24/18 17:17 Dose:  20 mg


Benzonatate (Tessalon Perles)  100 mg PO Q8 [facility]


   Last Admin: 09/24/18 21:29 Dose:  100 mg


Hydralazine HCl (Apresoline)  10 mg IVP Q6 PRN


   PRN Reason: elevated blood pressure


   Last Admin: 09/23/18 15:48 Dose:  10 mg


Piperacillin Sod/Tazobactam Sod (Zosyn 3.375 In Ns 100ml)  100 mls @ 200 mls/hr 

IVPB Q6 [facility]


   PRN Reason: Protocol


   Stop: 09/27/18 12:01


   Last Admin: 09/24/18 17:18 Dose:  200 mls/hr


Lactated Ringer's (Lactated Ringer's)  1,000 mls @ 100 mls/hr IV .Q10H [facility]


   Last Admin: 09/24/18 20:30 Dose:  Not Given


Metoprolol Tartrate (Lopressor)  25 mg PO BID [facility]


   Last Admin: 09/24/18 17:17 Dose:  25 mg


Ondansetron HCl (Zofran Inj)  4 mg IVP Q6H PRN


   PRN Reason: Nausea/Vomiting


   Last Admin: 09/20/18 07:22 Dose:  4 mg


Pantoprazole Sodium (Protonix Ec Tab)  40 mg PO 0600 [facility]


   Last Admin: 09/24/18 05:34 Dose:  40 mg











- Labs


Labs: 


 





 09/24/18 06:30 





 09/24/18 06:30 





 











PT  13.8 SECONDS (9.4-12.5)  H  09/21/18  08:20    


 


INR  1.20   09/21/18  08:20    


 


APTT  28.9 Seconds (25.1-36.5)   09/21/18  08:20    














- Constitutional


Appears: Non-toxic, No Acute Distress, Chronically Ill





- Head Exam


Head Exam: NORMAL INSPECTION





- Respiratory Exam


Respiratory Exam: Decreased Breath Sounds





- Cardiovascular Exam


Cardiovascular Exam: +S1, +S2





- GI/Abdominal Exam


GI & Abdominal Exam: Soft.  absent: Tenderness





Assessment and Plan





- Assessment and Plan (Free Text)


Plan: 


Assessment


cholelithiasis, consider acute cholecystitis with choledocholelithiasis S/P 

ERCP and sphincterotomy and extraction of stone, with Strep viridans bacteremia


Fong's esophagus


history of Sepsis secondary to MSSA bacteremia


Aortic valve disease S/P aortic valve replacement


CAD


Hyperlipidemia


GERD





Plan


Continue Zosyn day 5 and follow up 2D echo; repeat blood cx are negative


awaiting plan or cholecystectomy


will continue to monitor clinically

## 2018-09-24 NOTE — RAD
Date of service: 



09/21/2018



PROCEDURE:  ERCP



HISTORY:

?  CBD OBST



COMPARISON:

None



TECHNIQUE:

 Total fluoroscopic time (continuous mode) utilized during the 

procedure 190 (seconds). 



FINDINGS:

Dose report: DLP 30.47 (mGy/m2): 



IMPRESSION:

Submitted images from the current procedure: 5.0

## 2018-09-24 NOTE — PN
DATE:  09/24/2018



LOCATION:  The patient is in room 573, bed 3.



This is an addendum to the progress note already dictated by Rosa Whelan.



The patient was admitted with cholecystitis and found to have stone in the

common bile duct as well as gallbladder, had ERCP done and the patient's

blood culture on 09/20/2018, the two were done, one was negative and

another one showed Streptococcus viridans.  Third blood culture was done on

09/22/2018 and that was negative.  The patient has been treated on

antibiotics.  The patient has a history of aortic valve replacement with

tissue valve, which was pig valve and the patient, from cardiac point of

view, asymptomatic.  Denies any chest pain, shortness of breath,

palpitation.



PLAN:  The patient is going to have echocardiogram today.  The patient will

be continued on IV Zosyn and also, I put the patient on Lopressor, but

clinically from cardiac point of view, the patient is asymptomatic and

stable.  We will follow the echo report and we will continue present

therapy in the meantime.







__________________________________________

Kane Ramos MD





DD:  09/24/2018 11:54:57

DT:  09/24/2018 13:34:19

Job # [id number]

## 2018-09-24 NOTE — PN
DATE:  09/24/2018



SUBJECTIVE:  The patient is lying in bed comfortably.  He denies any

abdominal pain.  His cholecystectomy plan for this morning was canceled due

to positive blood cultures for Strep viridans.  He underwent an

echocardiogram this morning to rule out endocarditis.



PHYSICAL EXAMINATION:

VITAL SIGNS:  Reveal temperature of 97.9, blood pressure 166/89, heart rate

of 59.

HEENT:  Reveal sclerae to be white.  Conjunctivae pink.

NECK:  Supple.

CHEST:  Lungs are clear.

HEART:  Reveals regular rate and rhythm.

ABDOMEN:  Soft, nontender.  No mass.

EXTREMITIES:  Show no edema.



LABORATORY DATA:  Reveal white blood cell count 5.1, hemoglobin 11.8. 

Liver enzymes reveal total bilirubin down to 1.4, AST down to 70, ALT down

to 192, alkaline phosphatase down to 210.



IMPRESSION:

1.  A 78-year-old male admitted to the hospital with epigastric pain, found

to have cholecystitis, elevated liver enzymes.  The patient had an

endoscopic retrograde cholangiopancreatography 72 hours ago for an impacted

common duct stone.  He had an endoscopic retrograde

cholangiopancreatography with sphincterotomy and stone extraction.  His

liver enzymes are trending downward.

2.  Positive Strep viridans in blood cultures, rule out endocarditis.  The

patient has a prosthetic aortic valve.



RECOMMENDATIONS:

1.  Await results of echocardiogram.

2.  The patient will need a cholecystectomy when he is cleared by

Cardiology.







__________________________________________

Kip Lemus MD









DD:  09/24/2018 12:16:01

DT:  09/24/2018 12:17:31

Job # [id number]

## 2018-09-24 NOTE — CP.PCM.PN
Subjective





- Date & Time of Evaluation


Date of Evaluation: 09/24/18


Time of Evaluation: 06:30





- Subjective


Subjective: 








Lying in bed, awake, slept good last night, going for surgery today





Reason for consultation and follow up: Cardiac evaluation and cardiac clearance 

for procedure, admitted for abdominal pain. gallstones, history of aortic valve 

replacement, hyperlipidemia, status post ERCP, for cholecystectomy today





Seen and examined by me and Dr. Massey





Objective





- Vital Signs/Intake and Output


Vital Signs (last 24 hours): 


                                        











Temp Pulse Resp BP Pulse Ox


 


 98 F   63   20   158/80 H  98 


 


 09/23/18 22:04  09/23/18 22:04  09/23/18 22:04  09/23/18 22:04  09/23/18 22:04











- Medications


Medications: 


                               Current Medications





Atorvastatin Calcium (Lipitor)  20 mg PO DIN [facility]


   Last Admin: 09/23/18 17:41 Dose:  20 mg


Benzonatate (Tessalon Perles)  100 mg PO Q8 [facility]


   Last Admin: 09/24/18 05:34 Dose:  100 mg


Hydralazine HCl (Apresoline)  10 mg IVP Q6 PRN


   PRN Reason: elevated blood pressure


   Last Admin: 09/23/18 15:48 Dose:  10 mg


Piperacillin Sod/Tazobactam Sod (Zosyn 3.375 In Ns 100ml)  100 mls @ 200 mls/hr 

IVPB Q6 [facility]


   PRN Reason: Protocol


   Stop: 09/27/18 12:01


   Last Admin: 09/24/18 05:34 Dose:  200 mls/hr


Sodium Chloride (Sodium Chloride 0.9%)  1,000 mls @ 100 mls/hr IV .Q10H [facility]


   Last Admin: 09/23/18 21:03 Dose:  100 mls/hr


Metoprolol Tartrate (Lopressor)  25 mg PO BID [facility]


   Last Admin: 09/23/18 17:41 Dose:  25 mg


Ondansetron HCl (Zofran Inj)  4 mg IVP Q6H PRN


   PRN Reason: Nausea/Vomiting


   Last Admin: 09/20/18 07:22 Dose:  4 mg


Pantoprazole Sodium (Protonix Ec Tab)  40 mg PO 0600 [facility]


   Last Admin: 09/24/18 05:34 Dose:  40 mg











- Labs


Labs: 


                                        





                                 09/23/18 07:30 





                                 09/23/18 07:30 





                                        











PT  13.8 SECONDS (9.4-12.5)  H  09/21/18  08:20    


 


INR  1.20   09/21/18  08:20    


 


APTT  28.9 Seconds (25.1-36.5)   09/21/18  08:20    














- Constitutional


Appears: No Acute Distress





- Eye Exam


Eye Exam: Normal appearance





- ENT Exam


ENT Exam: Mucous Membranes Moist





- Respiratory Exam


Respiratory Exam: Decreased Breath Sounds, Clear to Ausculation Bilateral, 

NORMAL BREATHING PATTERN





- Cardiovascular Exam


Cardiovascular Exam: REGULAR RHYTHM, +S1, +S2





- GI/Abdominal Exam


GI & Abdominal Exam: Soft, Normal Bowel Sounds





- Extremities Exam


Extremities Exam: Normal Capillary Refill





- Neurological Exam


Neurological Exam: Alert, Awake, Oriented x3





- Psychiatric Exam


Psychiatric exam: Normal Affect





- Skin


Skin Exam: Dry, Warm





Assessment and Plan





- Assessment and Plan (Free Text)


Assessment: 





A 78 year old  male who came in to the ER due to abdominal pain. Work up showed 

gallstones. History of aortic valve replacement (2009) hyperlipidemia, 

hypertension,GERD. Status post ERCP and sphincterotomy and stone extraction last

Friday. Moderate to high risk for surgery however no absolute contraindication 

to undergo procedure. No evidence of heart failure or coronary ischemia. Cleared

for surgery from cardiac standpoint. For  cholecystectomy Monday.





Plan: 





 For cholecystectomy today by Dr. Lemus


Cleared for surgery from cardiac standpoint for surgery


Controlled blood pressure


Heart rate stable


Hydralazine IV PRN for elevated blood pressure


Replenished potassium yesterday, labs today pending


Continue IV antibiotics per ID


Continue current medications


Continue current treatment





Will follow up postoperatively





Plan and treatment discussed with Dr. Massey

## 2018-09-25 LAB
ALBUMIN SERPL-MCNC: 3.7 G/DL (ref 3–4.8)
ALBUMIN/GLOB SERPL: 1.2 {RATIO} (ref 1.1–1.8)
ALT SERPL-CCNC: 166 U/L (ref 7–56)
APTT BLD: 28.3 SECONDS (ref 25.1–36.5)
AST SERPL-CCNC: 61 U/L (ref 17–59)
BASOPHILS # BLD AUTO: 0.04 K/MM3 (ref 0–2)
BASOPHILS NFR BLD: 0.6 % (ref 0–3)
BUN SERPL-MCNC: 5 MG/DL (ref 7–21)
CALCIUM SERPL-MCNC: 9 MG/DL (ref 8.4–10.5)
EOSINOPHIL # BLD: 0.4 10*3/UL (ref 0–0.7)
EOSINOPHIL NFR BLD: 6.1 % (ref 1.5–5)
ERYTHROCYTE [DISTWIDTH] IN BLOOD BY AUTOMATED COUNT: 14 % (ref 11.5–14.5)
GFR NON-AFRICAN AMERICAN: > 60
GRANULOCYTES # BLD: 3.87 10*3/UL (ref 1.4–6.5)
GRANULOCYTES NFR BLD: 62.7 % (ref 50–68)
HGB BLD-MCNC: 12.3 G/DL (ref 14–18)
INR PPP: 1.05
LYMPHOCYTES # BLD: 1.2 10*3/UL (ref 1.2–3.4)
LYMPHOCYTES NFR BLD AUTO: 19.5 % (ref 22–35)
MCH RBC QN AUTO: 28 PG (ref 25–35)
MCHC RBC AUTO-ENTMCNC: 32.5 G/DL (ref 31–37)
MCV RBC AUTO: 86.1 FL (ref 80–105)
MONOCYTES # BLD AUTO: 0.7 10*3/UL (ref 0.1–0.6)
MONOCYTES NFR BLD: 11.1 % (ref 1–6)
PLATELET # BLD: 135 10^3/UL (ref 120–450)
PMV BLD AUTO: 10.8 FL (ref 7–11)
PROTHROMBIN TIME: 12.1 SECONDS (ref 9.4–12.5)
RBC # BLD AUTO: 4.4 10^6/UL (ref 3.5–6.1)
WBC # BLD AUTO: 6.2 10^3/UL (ref 4.5–11)

## 2018-09-25 RX ADMIN — PIPERACILLIN AND TAZOBACTAM SCH MLS/HR: 3; .375 INJECTION, POWDER, LYOPHILIZED, FOR SOLUTION INTRAVENOUS; PARENTERAL at 23:55

## 2018-09-25 RX ADMIN — PIPERACILLIN AND TAZOBACTAM SCH MLS/HR: 3; .375 INJECTION, POWDER, LYOPHILIZED, FOR SOLUTION INTRAVENOUS; PARENTERAL at 00:00

## 2018-09-25 RX ADMIN — PIPERACILLIN AND TAZOBACTAM SCH MLS/HR: 3; .375 INJECTION, POWDER, LYOPHILIZED, FOR SOLUTION INTRAVENOUS; PARENTERAL at 06:12

## 2018-09-25 RX ADMIN — PIPERACILLIN AND TAZOBACTAM SCH MLS/HR: 3; .375 INJECTION, POWDER, LYOPHILIZED, FOR SOLUTION INTRAVENOUS; PARENTERAL at 18:15

## 2018-09-25 RX ADMIN — PANTOPRAZOLE SODIUM SCH MG: 40 TABLET, DELAYED RELEASE ORAL at 06:12

## 2018-09-25 RX ADMIN — PIPERACILLIN AND TAZOBACTAM SCH MLS/HR: 3; .375 INJECTION, POWDER, LYOPHILIZED, FOR SOLUTION INTRAVENOUS; PARENTERAL at 11:54

## 2018-09-25 NOTE — CARD
--------------- APPROVED REPORT --------------





Date of service: 09/24/2018



EXAM: Two-dimensional and M-mode echocardiogram with Doppler and 

color Doppler.



Other Information 

Quality : AverageRhythm : 



INDICATION

R/O SBE



2D DIMENSIONS 

Left Atrium (2D)4.0   (1.6-4.0cm)IVSd1.2   (0.7-1.1cm)

LVDd4.7   (3.9-5.9cm)PWd1.2   (0.7-1.1cm)

LVDs3.3   (2.5-4.0cm)LVEF (%)57.0   (>50%)



M-Mode DIMENSIONS 

Aortic Root3.20   (2.2-3.7cm)Aortic Cusp Exc.1.40   (1.5-2.0cm)



Aortic Valve

AoV Peak Csgscbxc411.0cm/Jose Armando Peak GR.19mmHg



Mitral Valve

MV E Cvieevea711.0cm/sMV A Vmbycvci28.9cm/sE/A ratio1.2



TDI

E/Lateral E'0.0E/Medial E'0.0



Tricuspid Valve

TR Peak Psonalzs543jj/sRAP [id number] Peak Gr.25mmHg

[id number]



 LEFT VENTRICLE 

The left ventricle is normal size. There is borderline to mild 

concentric left ventricular hypertrophy. The left ventricular 

function is normal. The left ventricular ejection fraction is within 

the normal range. There is normal LV segmental wall motion.



 RIGHT VENTRICLE 

The right ventricle is normal size.



 ATRIA 

The left atrium size is normal. The right atrium size is normal. The 

interatrial septum is intact with no evidence for an atrial septal 

defect.



 AORTIC VALVE 

The aortic valve is mildly calcified.



 MITRAL VALVE 

The mitral valve is normal in structure. Mitral annular calcification 

is mild. Mitral regurgitation is mild.



 TRICUSPID VALVE 

The tricuspid valve is normal in structure. There is mild tricuspid 

regurgitation.



 PULMONIC VALVE 

The pulmonic valve is not well visualized. There is mild pulmonic 

valvular regurgitation. 



 GREAT VESSELS 

The aortic root is normal in size.



 PERICARDIAL EFFUSION 

There is no pericardial effusion.



<Conclusion>

The left ventricle is normal size.

There is borderline to mild concentric left ventricular hypertrophy.

The left ventricular function is normal.

The aortic valve is mildly calcified. Aortic sclerosis.

Mitral regurgitation is mild.

There is  mild tricuspid regurgitation.

There is mild pulmonic valvular regurgitation.

## 2018-09-25 NOTE — CP.PCM.PN
<Daysi Anderson - Last Filed: 09/25/18 17:16>





Subjective





- Date & Time of Evaluation


Date of Evaluation: 09/25/18


Time of Evaluation: 07:40





- Subjective


Subjective: 





PGY-1 Daysi Anderson D.O. Medicine progress note for Dr. Valdovinos's service:





Patient was seen and examined this morning. Patient has no acute complaints. He 

is eager to get his surgery. He echo was completed, but it was explained to the 

patient that a cardiologist had to read it prior to scheduling the lap choly. 

Patient is tolerating PO intake. His pain is well controlled without 

medications.








Objective





- Vital Signs/Intake and Output


Vital Signs (last 24 hours): 


                                        











Temp Pulse Resp BP Pulse Ox


 


 98.2 F   62   18   154/80 H  99 


 


 09/24/18 14:00  09/24/18 17:17  09/24/18 14:00  09/24/18 17:17  09/24/18 14:00








Intake and Output: 


                                        











 09/24/18 09/25/18





 18:59 06:59


 


Intake Total  480


 


Balance  480














- Medications


Medications: 


                               Current Medications





Atorvastatin Calcium (Lipitor)  20 mg PO DIN [facility]


   Last Admin: 09/24/18 17:17 Dose:  20 mg


Benzonatate (Tessalon Perles)  100 mg PO Q8 [facility]


   Last Admin: 09/25/18 06:16 Dose:  100 mg


Hydralazine HCl (Apresoline)  10 mg IVP Q6 PRN


   PRN Reason: elevated blood pressure


   Last Admin: 09/23/18 15:48 Dose:  10 mg


Piperacillin Sod/Tazobactam Sod (Zosyn 3.375 In Ns 100ml)  100 mls @ 200 mls/hr 

IVPB Q6 [facility]; Protocol


   Stop: 09/27/18 12:01


   Last Admin: 09/25/18 06:12 Dose:  200 mls/hr


Lactated Ringer's (Lactated Ringer's)  1,000 mls @ 100 mls/hr IV .Q10H [facility]


   Last Admin: 09/25/18 06:14 Dose:  100 mls/hr


Metoprolol Tartrate (Lopressor)  25 mg PO BID [facility]


   Last Admin: 09/24/18 17:17 Dose:  25 mg


Ondansetron HCl (Zofran Inj)  4 mg IVP Q6H PRN


   PRN Reason: Nausea/Vomiting


   Last Admin: 09/20/18 07:22 Dose:  4 mg


Pantoprazole Sodium (Protonix Ec Tab)  40 mg PO 0600 [facility]


   Last Admin: 09/25/18 06:12 Dose:  40 mg











- Labs


Labs: 


                                        





                                 09/24/18 06:30 





                                 09/24/18 06:30 





                                        











PT  13.8 SECONDS (9.4-12.5)  H  09/21/18  08:20    


 


INR  1.20   09/21/18  08:20    


 


APTT  28.9 Seconds (25.1-36.5)   09/21/18  08:20    














- Constitutional


Appears: Non-toxic, No Acute Distress





- Head Exam


Head Exam: ATRAUMATIC, NORMAL INSPECTION





- Eye Exam


Eye Exam: EOMI, Normal appearance





- ENT Exam


ENT Exam: Mucous Membranes Moist, Normal Exam





- Neck Exam


Neck Exam: Normal Inspection





- Respiratory Exam


Respiratory Exam: Clear to Ausculation Bilateral, NORMAL BREATHING PATTERN





- Cardiovascular Exam


Cardiovascular Exam: REGULAR RHYTHM, +S1, +S2, Murmur





- GI/Abdominal Exam


GI & Abdominal Exam: Soft, Normal Bowel Sounds.  absent: Firm, Tenderness, Mass,

Rebound





- Rectal Exam


Rectal Exam: Deferred





- Extremities Exam


Extremities Exam: Normal Capillary Refill, Normal Inspection.  absent: Pedal 

Edema, Tenderness





- Back Exam


Back Exam: NORMAL INSPECTION





- Neurological Exam


Neurological Exam: Alert, Awake, CN II-XII Intact, Oriented x3


Neuro motor strength exam: Left Upper Extremity: 5, Right Upper Extremity: 5, 

Left Lower Extremity: 5, Right Lower Extremity: 5





- Psychiatric Exam


Psychiatric exam: Normal Affect, Normal Mood





- Skin


Skin Exam: Dry, Intact, Normal Color, Warm





Assessment and Plan





- Assessment and Plan (Free Text)


Assessment: 





Patient is a 78 year old male with a past medical history significant for GERD, 

HTN, HLD, AS s/p bioprosthetic AVR (2009), inguinal hernia s/p LIHR who 

presented with RUQ pain for several days. On imaging, patient was found to have 

gallstones with biliary ductal dilation. MRCP showed a 3mm stone in the CBD. 

Surgery and GI are following the case. Patient was admitted for 

choledocholithiasis and had successful removal of stone via ERCP. Patient lap 

choly is postposed due to 1/2 positive blood cultures. Rescheduled for tomorrow 

following echo. 





Plan: 





Choledocholithiasis s/p ERCP- lap cholecystecomy tomorrow 9/26


   - Abdominal US (9/19): gallstones with wall edema and no sludge


   - CT abd/pelvis (9/19): mild intrahepatic biliary ductal dilation with ga

llstones


   - MRCP (9/20): 3 mm stone in the CBD. CBD dilated to 6mm. Multiple large 

gallstones seen. 


   - ERCP (9/21): esophageal mucosal changes suspicious for short segment 

Barretts esophagus, biopsies taken at the distal esophagus.    


   Choledocholithiasis was found. Complete removal accomplished by biliary 

sphincterotomy and balloon extraction.


   - Blood Cx 1/2 positive for Strep viridan; likely contamination. 


   - Repeat blood Cx negative >3 days   


   - Echo: EF 57% no vegetations observed


   - Zosyn 3.375 mg IV Q6H (started 9/20)


   - Zofran 4 mg IV Q6H PRN


   - Surgery consulted ([facility])


   - Cardio consulted ([facility])- previously cleared, echo does not show vegetations





Transaminitis, improving- likely 2/2 choledocholithiasis


   - T. Bili now wnl


   - Alk phos downtrending


   - Monitor daily


   - GI consulted ([facility])





HTN 


   - Hydralazine 10 mg IVP Q6H PRN 


   - Metoprolol 25 mg PO BID 





GERD


   - Pending biopsy results from ERCP (suspicious for Fong's esophagus)


   - Protonix 40 mg PO daily





HLD


   - Lipitor 20 mg PO QHS





IVF: LR @ 100


Diet: full liquids- NPO at midnight


DVT ppx: SCD


GI ppx: Protonix 40 mg PO daily


Code status: full code





Case discussed with attending, Dr. Valdovinos.








<Lazara Valdovinos R - Last Filed: 09/26/18 15:53>





Objective





- Vital Signs/Intake and Output


Vital Signs (last 24 hours): 


                                        











Temp Pulse Resp BP Pulse Ox


 


 98.1 F   60   20   177/83 H  95 


 


 09/26/18 13:50  09/26/18 13:50  09/26/18 13:50  09/26/18 13:50  09/26/18 13:50








Intake and Output: 


                                        











 09/26/18 09/26/18





 06:59 18:59


 


Intake Total 2820 


 


Balance 2820 














- Medications


Medications: 


                               Current Medications





Atorvastatin Calcium (Lipitor)  20 mg PO DIN [facility]


   Last Admin: 09/25/18 18:15 Dose:  20 mg


Benzonatate (Tessalon Perles)  100 mg PO Q8 [facility]


   Last Admin: 09/26/18 14:23 Dose:  Not Given


Hydralazine HCl (Apresoline)  10 mg IVP Q6 PRN


   PRN Reason: elevated blood pressure


   Last Admin: 09/26/18 05:29 Dose:  10 mg


Piperacillin Sod/Tazobactam Sod (Zosyn 3.375 In Ns 100ml)  100 mls @ 200 mls/hr 

IVPB Q6 [facility]; Protocol


   Stop: 09/27/18 12:01


   Last Admin: 09/26/18 11:11 Dose:  200 mls/hr


Lactated Ringer's (Lactated Ringer's)  1,000 mls @ 50 mls/hr IV .Q20H [facility]


   Last Admin: 09/26/18 10:28 Dose:  50 mls/hr


Metoprolol Tartrate (Lopressor)  25 mg PO BID [facility]


   Last Admin: 09/26/18 10:30 Dose:  25 mg


Ondansetron HCl (Zofran Inj)  4 mg IVP Q6H PRN


   PRN Reason: Nausea/Vomiting


   Last Admin: 09/20/18 07:22 Dose:  4 mg


Pantoprazole Sodium (Protonix Ec Tab)  40 mg PO 0600 [facility]


   Last Admin: 09/26/18 05:30 Dose:  Not Given











- Labs


Labs: 


                                        





                                 09/26/18 06:45 





                                 09/26/18 06:45 





                                        











PT  12.1 SECONDS (9.4-12.5)   09/25/18  07:15    


 


INR  1.05   09/25/18  07:15    


 


APTT  28.3 Seconds (25.1-36.5)   09/25/18  07:15    














Attending/Attestation





- Attestation


I have personally seen and examined this patient.: Yes


I have fully participated in the care of the patient.: Yes


I have reviewed all pertinent clinical information, including history, physical 

exam and plan: Yes


Notes (Text): 


Patient seen and examined by me at 12:50 PM with resident 9/25/18. Case 

including HPI, physical exam, and assessment and plan discussed with resident. 

Agree with above with following additions/corrections.


Patient is a 78-year-old male with past medical history significant for 

hyperlipidemia, GERD, valve replacement 2009, and gallstones that presented to 

emergency room with 3 day history of right-sided abdominal pain.


Patient states he is feeling pretty good. Patient is anxious to have his surgery

done. Patient wants to eat a regular diet. Denies any right upper quadrant pain 

today. No nausea or vomiting. No chest pain or shortness of breath. No headaches

or dizziness. No fevers or chills. No diarrhea or constipation. No dysuria. 


Physical exam:


General: Awake and alert sitting up in bed in no acute distress


HEENT: Normocephalic atraumatic. Pupils equal reactive. No scleral icterus. 

Oropharynx is pink and moist. Neck is supple.


Cardiovascular: Normal rhythm. Normal S1, S2. Positive systolic murmur. No rubs 

or gallops appreciated 


Pulmonary: Normal respiratory effort. No rhonchi, rales or wheezing appreciated.




Gastrointestinal: Soft, nondistended. Nontender. Positive bowel sounds all 4 

quadrants, no guarding. 


Musculoskeletal: Moves all extremities, no calf tenderness, no edema 

appreciated. 


Central nervous system: AAOx 3, CN 2-12 grossly intact


Dermatologic:  Skin warm and dry. 


Assessment and plan: Patient is a 78-year-old male with past medical history 

significant for hyperlipidemia, GERD, valve replacement 2009, and gallstones 

that presented to emergency room with 3 day history of right-sided abdominal 

pain.


1.  Choledocholithiasis. Status post ERCP 09/21/2018 with removal of stone with 

balloon extraction, biliary sphincterotomy. ERCP also  showed esophageal mucosal

changes suspicious for short segment Fong's esophagus. Follow-up biopsy. 

Patient for cholecystectomy tomorrow. CT abdomen and pelvis per radiologist 

showed gallstones, mild intrahepatic ductal dilatation. Gallbladder ultrasound 

per radiology shows liver is mildly enlarged measuring 17.1 cm in length, there 

is diffuse increase in hepatic parenchymal echogenicity consistent with fatty 

infiltration, there are no focal lesions present, there is mild dilatation of 

the intrahepatic biliary ducts; multiple echogenic shadowing gallstones and 

echogenic sludge are noted in the partially distended gallbladder with diffuse 5

mm wall thickening, no sonographic Connelly sign seen; the CBD is mildly dilated 

measuring 7 mm, no discrete CBD stone is seen. MRCP per radiologist shows small 

3 mm stone in the distal common duct, common duct measures 6 mm in diameter, 

multiple large gallstones are seen.  Cardiology following, patient cleared by 

cardiology for surgery.


2. Hypertension. Continue metoprolol. Continue hydralazine every 6 hours as 

needed.


3. Blood culture positive. One blood culture positive for strep viridans. Repeat

blood cultures with no growth. 2d echo per cardiologist shows left ventricle is 

normal size, there is borderline to mild concentric left ventricular 

hypertrophy, left ventricular function is normal, aortic valve is mildly c

alcified, aortic sclerosis, neelima regurgitation is mild, mild tricuspid 

regurgitation, mild pulmonic valvular regurgitation. ID following, 

recommendations appreciated. Continue with Zosyn.


4. Transaminitis. Likely secondary to #1. Downtrending. Continue to monitor.


5. Hyperlipidemia. Continue Lipitor.


6. GERD. Possible Fong's esophagus. Pending biopsy results. Continue 

Protonix.


7. GI and DVT prophylaxis. Protonix and SCDs with early ambulation


8. Patient is a full code


Case was discussed in detail with the patient regarding current diagnosis and 

treatment plan. All questions answered.

## 2018-09-25 NOTE — CP.PCM.PN
Subjective





- Date & Time of Evaluation


Date of Evaluation: 09/25/18


Time of Evaluation: 11:35





- Subjective


Subjective: 





Patient is for possible surgery today, no fevers, not in distress, no abdominal 

pain currently.





Objective





- Vital Signs/Intake and Output


Vital Signs (last 24 hours): 


                                        











Temp Pulse Resp BP Pulse Ox


 


 97.8 F   62   20   132/71   100 


 


 09/25/18 14:00  09/25/18 18:16  09/25/18 14:00  09/25/18 18:16  09/25/18 14:00











- Medications


Medications: 


                               Current Medications





Atorvastatin Calcium (Lipitor)  20 mg PO DIN [facility]


   Last Admin: 09/25/18 18:15 Dose:  20 mg


Benzonatate (Tessalon Perles)  100 mg PO Q8 [facility]


   Last Admin: 09/25/18 15:41 Dose:  100 mg


Hydralazine HCl (Apresoline)  10 mg IVP Q6 PRN


   PRN Reason: elevated blood pressure


   Last Admin: 09/23/18 15:48 Dose:  10 mg


Piperacillin Sod/Tazobactam Sod (Zosyn 3.375 In Ns 100ml)  100 mls @ 200 mls/hr 

IVPB Q6 [facility]; Protocol


   Stop: 09/27/18 12:01


   Last Admin: 09/25/18 18:15 Dose:  200 mls/hr


Lactated Ringer's (Lactated Ringer's)  1,000 mls @ 100 mls/hr IV .Q10H [facility]


   Last Admin: 09/25/18 06:14 Dose:  100 mls/hr


Metoprolol Tartrate (Lopressor)  25 mg PO BID [facility]


   Last Admin: 09/25/18 18:16 Dose:  25 mg


Ondansetron HCl (Zofran Inj)  4 mg IVP Q6H PRN


   PRN Reason: Nausea/Vomiting


   Last Admin: 09/20/18 07:22 Dose:  4 mg


Pantoprazole Sodium (Protonix Ec Tab)  40 mg PO 0600 [facility]


   Last Admin: 09/25/18 06:12 Dose:  40 mg











- Labs


Labs: 


                                        





                                 09/25/18 07:15 





                                 09/25/18 07:15 





                                        











PT  12.1 SECONDS (9.4-12.5)   09/25/18  07:15    


 


INR  1.05   09/25/18  07:15    


 


APTT  28.3 Seconds (25.1-36.5)   09/25/18  07:15    














- Constitutional


Appears: Chronically Ill





- Neck Exam


Neck Exam: absent: Meningismus





- Respiratory Exam


Respiratory Exam: Decreased Breath Sounds





- Cardiovascular Exam


Cardiovascular Exam: +S1, +S2





- GI/Abdominal Exam


GI & Abdominal Exam: Soft.  absent: Tenderness





Assessment and Plan





- Assessment and Plan (Free Text)


Plan: 





Assessment


cholelithiasis, consider acute cholecystitis with choledocholelithiasis S/P ERCP

and sphincterotomy and extraction of stone, with Strep viridans bacteremia


Fong's esophagus


history of Sepsis secondary to MSSA bacteremia


Aortic valve disease S/P aortic valve replacement


CAD


Hyperlipidemia


GERD





Plan


Continue Zosyn day 6; 2D echo does not show vegetstions; repeat blood cx are 

negative


awaiting plan for cholecystectomy


will continue to monitor clinically

## 2018-09-25 NOTE — PN
DATE:  09/25/2018



REASON FOR THE DICTATION:  Addendum to the initial progress note dictated

this morning by our nurse practitioner, Rosa Whelan.



SUBJECTIVE:  Echo reviewed.  Preserved LV function.  Mild MR.  Mild TR.  No

evidence of vegetation.  The patient is cleared to go for surgery.  Repeat

blood cultures are negative.  Information has been communicated to the

surgical resident.  Interim, continue broad-spectrum antibiotic.  We will

follow with you.  Again, the patient is cleared to go for surgery.  From

cardiology point of view, he is moderate risk and underlying comorbidity. 

No contraindication for surgery at this time.  No evidence of endocarditis

by echo.  Repeat blood culture from 09/20/2018 is negative and also from

09/22/2018 is negative for bacteremia.



Thank you, Dr. Valdovinos for providing us the opportunity in taking care of the

patientYaniv.





__________________________________________

Kane Massey MD





DD:  09/25/2018 12:31:17

DT:  09/25/2018 15:15:24

Job # [id number]

## 2018-09-25 NOTE — PN
DATE:  09/25/2018



SUBJECTIVE:  The patient is lying in bed, comfortable.  He is awaiting his

laparoscopic cholecystectomy.  He denies any abdominal pain. 

Echocardiogram yesterday did not show any vegetations on his bowels. 

Repeat blood cultures are negative.  Again, he denies any abdominal pain.



OBJECTIVE:

VITAL SIGNS:  Reveal temperature of 97.9, blood pressure of 185/86, heart

rate 95.

HEENT:  Reveal sclerae to be white.  Conjunctivae pink.

NECK:  Supple.

CHEST:  Reveal lungs to be clear.

HEART:  Exam reveals regular rate and rhythm.

ABDOMEN:  Soft, nontender.

EXTREMITIES:  Show no edema.



DATA:  Laboratory data reveal white blood cell count 6.2, hemoglobin 12.3,

platelet count 135,000.  Chemistries reveal AST down to 61, ALT down to

166, alkaline phosphatase down to 207, total bilirubin down to normal. 

Again, his blood cultures are negative.



IMPRESSION:

1.  Acute cholecystitis.

2.  Recent common bile duct stone with elevated liver enzymes, status post

endoscopic retrograde cholangiopancreatography with sphincterotomy and

stone extraction.  His liver enzymes are trending downward.

3.  His recent Strep viridans and Staph aureus sepsis.  Repeat blood

cultures were negative.  Echocardiogram is negative for vegetations.



RECOMMENDATIONS:  The patient is to have his cholecystectomy today.







__________________________________________

Kip Lemus MD





DD:  09/25/2018 14:28:09

DT:  09/25/2018 14:30:53

Job # [id number]

## 2018-09-25 NOTE — CP.PCM.PN
Subjective





- Date & Time of Evaluation


Date of Evaluation: 09/25/18


Time of Evaluation: 06:30





- Subjective


Subjective: 





Awake, no distress, claimed surgery cancelled yesterday





Reason for consultation and follow up: Cardiac evaluation and cardiac clearance 

for procedure, admitted for abdominal pain. gallstones, history of aortic valve 

replacement, hyperlipidemia, status post ERCP, for cholecystectomy today





Seen and examined by me and Dr. Massey








Objective





- Vital Signs/Intake and Output


Vital Signs (last 24 hours): 


                                        











Temp Pulse Resp BP Pulse Ox


 


 98.2 F   62   18   154/80 H  99 


 


 09/24/18 14:00  09/24/18 17:17  09/24/18 14:00  09/24/18 17:17  09/24/18 14:00








Intake and Output: 


                                        











 09/25/18 09/25/18





 06:59 18:59


 


Intake Total 480 


 


Balance 480 














- Medications


Medications: 


                               Current Medications





Atorvastatin Calcium (Lipitor)  20 mg PO DIN [facility]


   Last Admin: 09/24/18 17:17 Dose:  20 mg


Benzonatate (Tessalon Perles)  100 mg PO Q8 [facility]


   Last Admin: 09/25/18 06:16 Dose:  100 mg


Hydralazine HCl (Apresoline)  10 mg IVP Q6 PRN


   PRN Reason: elevated blood pressure


   Last Admin: 09/23/18 15:48 Dose:  10 mg


Piperacillin Sod/Tazobactam Sod (Zosyn 3.375 In Ns 100ml)  100 mls @ 200 mls/hr 

IVPB Q6 [facility]; Protocol


   Stop: 09/27/18 12:01


   Last Admin: 09/25/18 06:12 Dose:  200 mls/hr


Lactated Ringer's (Lactated Ringer's)  1,000 mls @ 100 mls/hr IV .Q10H [facility]


   Last Admin: 09/25/18 06:14 Dose:  100 mls/hr


Metoprolol Tartrate (Lopressor)  25 mg PO BID [facility]


   Last Admin: 09/24/18 17:17 Dose:  25 mg


Ondansetron HCl (Zofran Inj)  4 mg IVP Q6H PRN


   PRN Reason: Nausea/Vomiting


   Last Admin: 09/20/18 07:22 Dose:  4 mg


Pantoprazole Sodium (Protonix Ec Tab)  40 mg PO 0600 [facility]


   Last Admin: 09/25/18 06:12 Dose:  40 mg











- Labs


Labs: 


                                        





                                 09/24/18 06:30 





                                 09/24/18 06:30 





                                        











PT  13.8 SECONDS (9.4-12.5)  H  09/21/18  08:20    


 


INR  1.20   09/21/18  08:20    


 


APTT  28.9 Seconds (25.1-36.5)   09/21/18  08:20    














- Constitutional


Appears: No Acute Distress





- Eye Exam


Eye Exam: Normal appearance





- ENT Exam


ENT Exam: Mucous Membranes Moist





- Respiratory Exam


Respiratory Exam: Decreased Breath Sounds, Clear to Ausculation Bilateral, 

NORMAL BREATHING PATTERN





- Cardiovascular Exam


Cardiovascular Exam: +S1, +S2





- GI/Abdominal Exam


GI & Abdominal Exam: Soft, Normal Bowel Sounds





- Neurological Exam


Neurological Exam: Alert, Awake, Oriented x3





- Psychiatric Exam


Psychiatric exam: Normal Affect





- Skin


Skin Exam: Dry, Warm





Assessment and Plan





- Assessment and Plan (Free Text)


Assessment: 


A 78 year old  male who came in to the ER due to abdominal pain. Work up showed 

gallstones. History of aortic valve replacement (2009) hyperlipidemia, 

hypertension,GERD. Status post ERCP and sphincterotomy and stone extraction last

Friday. Moderate to high risk for surgery however no absolute contraindication 

to undergo procedure. No evidence of heart failure or coronary ischemia. Cleared

for surgery from cardiac standpoint. For  cholecystectomy supposedly  Monday 

however postponed due to positive blood culture (1 of 2 blood culture-

(Streptococcus Viridans)

















Plan: 





Positive blood culture ((1 of 2 blood culture-(Streptococcus Viridans)


ID on consult


Continue IV antibiotics as ordered by ID


Repeat blood cultures done- negative growth after 48 hours


 awaiting final results.


Repeat echo to rule out endocarditis pending results


Surgery for Cholecystectomy held for now


Controlled blood pressure


Heart rate stable


Continue current medications


Continue current treatment





Will follow up postoperatively





Plan and treatment discussed with Dr. Massey

## 2018-09-25 NOTE — CP.PCM.PN
<Lashon Wakefield - Last Filed: 09/25/18 15:06>





Subjective





- Date & Time of Evaluation


Date of Evaluation: 09/25/18


Time of Evaluation: 07:00





- Subjective


Subjective: 


General Surgery


Dr. Valdovinos





Pt S&E @bedside. No acute events overnight. pt has no complaints. denies F/C, 

N/V, D/C. tolerating CLD. 





Objective





- Vital Signs/Intake and Output


Vital Signs (last 24 hours): 


                                        











Temp Pulse Resp BP Pulse Ox


 


 97.9 F   95 H  20   185/86 H  96 


 


 09/25/18 06:00  09/25/18 09:21  09/25/18 06:00  09/25/18 09:21  09/25/18 06:00








Intake and Output: 


                                        











 09/25/18 09/25/18





 06:59 18:59


 


Intake Total 480 


 


Balance 480 














- Medications


Medications: 


                               Current Medications





Atorvastatin Calcium (Lipitor)  20 mg PO DIN [facility]


   Last Admin: 09/24/18 17:17 Dose:  20 mg


Benzonatate (Tessalon Perles)  100 mg PO Q8 [facility]


   Last Admin: 09/25/18 06:16 Dose:  100 mg


Hydralazine HCl (Apresoline)  10 mg IVP Q6 PRN


   PRN Reason: elevated blood pressure


   Last Admin: 09/23/18 15:48 Dose:  10 mg


Piperacillin Sod/Tazobactam Sod (Zosyn 3.375 In Ns 100ml)  100 mls @ 200 mls/hr 

IVPB Q6 [facility]; Protocol


   Stop: 09/27/18 12:01


   Last Admin: 09/25/18 11:54 Dose:  200 mls/hr


Lactated Ringer's (Lactated Ringer's)  1,000 mls @ 100 mls/hr IV .Q10H [facility]


   Last Admin: 09/25/18 06:14 Dose:  100 mls/hr


Metoprolol Tartrate (Lopressor)  25 mg PO BID [facility]


   Last Admin: 09/25/18 09:21 Dose:  25 mg


Ondansetron HCl (Zofran Inj)  4 mg IVP Q6H PRN


   PRN Reason: Nausea/Vomiting


   Last Admin: 09/20/18 07:22 Dose:  4 mg


Pantoprazole Sodium (Protonix Ec Tab)  40 mg PO 0600 [facility]


   Last Admin: 09/25/18 06:12 Dose:  40 mg











- Labs


Labs: 


                                        





                                 09/25/18 07:15 





                                 09/25/18 07:15 





                                        











PT  12.1 SECONDS (9.4-12.5)   09/25/18  07:15    


 


INR  1.05   09/25/18  07:15    


 


APTT  28.3 Seconds (25.1-36.5)   09/25/18  07:15    














- Constitutional


Appears: Non-toxic, No Acute Distress





- Head Exam


Head Exam: NORMAL INSPECTION





- Eye Exam


Eye Exam: Normal appearance





- ENT Exam


ENT Exam: Mucous Membranes Moist





- Respiratory Exam


Respiratory Exam: NORMAL BREATHING PATTERN.  absent: Accessory Muscle Use, 

Respiratory Distress





- Cardiovascular Exam


Cardiovascular Exam: REGULAR RHYTHM.  absent: Bradycardia, Tachycardia





- GI/Abdominal Exam


GI & Abdominal Exam: Soft, Tenderness (minimal TTP RUQ).  absent: Distended, 

Firm, Guarding, Rigid, Rebound





- Extremities Exam


Extremities Exam: Normal Inspection





- Neurological Exam


Neurological Exam: Alert, Awake, Oriented x3





- Psychiatric Exam


Psychiatric exam: Normal Affect, Normal Mood





- Skin


Skin Exam: Dry, Intact, Normal Color, Warm





Assessment and Plan





- Assessment and Plan (Free Text)


Assessment: 





79 y/o M w/ choledocholithiasis





- CLD, NPO@[place]


- cleared by Cardiology for OR


- OR tomorrow for Lap hemal


- cont pain management


- f/u AM labs


- encourage OOB to chair/Amb





Pt discussed w/ Dr. Bonifacio Wakefield DO PGY3





<Laureano Valdovinos - Last Filed: 09/25/18 19:16>





Objective





- Vital Signs/Intake and Output


Vital Signs (last 24 hours): 


                                        











Temp Pulse Resp BP Pulse Ox


 


 97.8 F   62   20   132/71   100 


 


 09/25/18 14:00  09/25/18 18:16  09/25/18 14:00  09/25/18 18:16  09/25/18 14:00











- Medications


Medications: 


                               Current Medications





Atorvastatin Calcium (Lipitor)  20 mg PO DIN [facility]


   Last Admin: 09/25/18 18:15 Dose:  20 mg


Benzonatate (Tessalon Perles)  100 mg PO Q8 [facility]


   Last Admin: 09/25/18 15:41 Dose:  100 mg


Hydralazine HCl (Apresoline)  10 mg IVP Q6 PRN


   PRN Reason: elevated blood pressure


   Last Admin: 09/23/18 15:48 Dose:  10 mg


Piperacillin Sod/Tazobactam Sod (Zosyn 3.375 In Ns 100ml)  100 mls @ 200 mls/hr 

IVPB Q6 [facility]; Protocol


   Stop: 09/27/18 12:01


   Last Admin: 09/25/18 18:15 Dose:  200 mls/hr


Lactated Ringer's (Lactated Ringer's)  1,000 mls @ 100 mls/hr IV .Q10H [facility]


   Last Admin: 09/25/18 06:14 Dose:  100 mls/hr


Metoprolol Tartrate (Lopressor)  25 mg PO BID [facility]


   Last Admin: 09/25/18 18:16 Dose:  25 mg


Ondansetron HCl (Zofran Inj)  4 mg IVP Q6H PRN


   PRN Reason: Nausea/Vomiting


   Last Admin: 09/20/18 07:22 Dose:  4 mg


Pantoprazole Sodium (Protonix Ec Tab)  40 mg PO 0600 [facility]


   Last Admin: 09/25/18 06:12 Dose:  40 mg











- Labs


Labs: 


                                        





                                 09/25/18 07:15 





                                 09/25/18 07:15 





                                        











PT  12.1 SECONDS (9.4-12.5)   09/25/18  07:15    


 


INR  1.05   09/25/18  07:15    


 


APTT  28.3 Seconds (25.1-36.5)   09/25/18  07:15    














Assessment and Plan





- Assessment and Plan (Free Text)


Plan: 


Patient seen and examined independent of resident staff. Agree with above 

assessment and plan. Labs normalizing s/p ERCP. Symptoms minimal. Mild 

Tenderness upper abd.  Scheduled for lap hemal tomorrow 1:30 pm. Clears today. 

NPO at midnight.

## 2018-09-26 VITALS — RESPIRATION RATE: 18 BRPM

## 2018-09-26 LAB
ALBUMIN SERPL-MCNC: 3.8 G/DL (ref 3–4.8)
ALBUMIN/GLOB SERPL: 1.2 {RATIO} (ref 1.1–1.8)
ALT SERPL-CCNC: 134 U/L (ref 7–56)
AST SERPL-CCNC: 47 U/L (ref 17–59)
BASOPHILS # BLD AUTO: 0.03 K/MM3 (ref 0–2)
BASOPHILS NFR BLD: 0.5 % (ref 0–3)
BUN SERPL-MCNC: 6 MG/DL (ref 7–21)
CALCIUM SERPL-MCNC: 9.4 MG/DL (ref 8.4–10.5)
EOSINOPHIL # BLD: 0.4 10*3/UL (ref 0–0.7)
EOSINOPHIL NFR BLD: 6.6 % (ref 1.5–5)
ERYTHROCYTE [DISTWIDTH] IN BLOOD BY AUTOMATED COUNT: 14 % (ref 11.5–14.5)
GFR NON-AFRICAN AMERICAN: > 60
GRANULOCYTES # BLD: 4.13 10*3/UL (ref 1.4–6.5)
GRANULOCYTES NFR BLD: 64.7 % (ref 50–68)
HGB BLD-MCNC: 12.9 G/DL (ref 14–18)
LYMPHOCYTES # BLD: 1.1 10*3/UL (ref 1.2–3.4)
LYMPHOCYTES NFR BLD AUTO: 17.9 % (ref 22–35)
MCH RBC QN AUTO: 28.2 PG (ref 25–35)
MCHC RBC AUTO-ENTMCNC: 32.7 G/DL (ref 31–37)
MCV RBC AUTO: 86.2 FL (ref 80–105)
MONOCYTES # BLD AUTO: 0.7 10*3/UL (ref 0.1–0.6)
MONOCYTES NFR BLD: 10.3 % (ref 1–6)
PLATELET # BLD: 149 10^3/UL (ref 120–450)
PMV BLD AUTO: 11.2 FL (ref 7–11)
RBC # BLD AUTO: 4.57 10^6/UL (ref 3.5–6.1)
WBC # BLD AUTO: 6.4 10^3/UL (ref 4.5–11)

## 2018-09-26 PROCEDURE — 0FT44ZZ RESECTION OF GALLBLADDER, PERCUTANEOUS ENDOSCOPIC APPROACH: ICD-10-PCS

## 2018-09-26 PROCEDURE — 0DNW4ZZ RELEASE PERITONEUM, PERCUTANEOUS ENDOSCOPIC APPROACH: ICD-10-PCS

## 2018-09-26 RX ADMIN — PANTOPRAZOLE SODIUM SCH: 40 TABLET, DELAYED RELEASE ORAL at 05:30

## 2018-09-26 RX ADMIN — PIPERACILLIN AND TAZOBACTAM SCH MLS/HR: 3; .375 INJECTION, POWDER, LYOPHILIZED, FOR SOLUTION INTRAVENOUS; PARENTERAL at 18:58

## 2018-09-26 RX ADMIN — PIPERACILLIN AND TAZOBACTAM SCH MLS/HR: 3; .375 INJECTION, POWDER, LYOPHILIZED, FOR SOLUTION INTRAVENOUS; PARENTERAL at 05:30

## 2018-09-26 RX ADMIN — PIPERACILLIN AND TAZOBACTAM SCH MLS/HR: 3; .375 INJECTION, POWDER, LYOPHILIZED, FOR SOLUTION INTRAVENOUS; PARENTERAL at 11:11

## 2018-09-26 NOTE — PN
DATE:  09/26/2018



SUBJECTIVE:  The patient was seen in bed, in no acute distress, nontoxic.



PHYSICAL EXAMINATION:

VITAL SIGNS:  On exam, temperature is 98, blood pressure is 150/70,

respiratory rate of 18.

HEENT:  Examination of HEENT is unremarkable.

NECK:  Supple.

LUNGS:  Have decreased breath sounds.

HEART:  Normal S1, S2.

ABDOMEN:  Soft, nontender.



LABORATORY DATA:  Laboratory examination reveals a white count of 6.4,

hemoglobin of 12.  BUN of 6, creatinine of 0.7.  Urinalysis is noted. 

Serology is negative.  Microbiology reveals blood cultures with strep

viridans, one bottle.  Sensitivity is noted.  Repeat blood cultures are

negative.  Review of orders reveals the patient to be on Zosyn.



ASSESSMENT AND PLAN:  A 78-year-old male with cholelithiasis and acute

cholecystitis and with choledocholithiasis, status post endoscopic

retrograde cholangiopancreatography, sphincterotomy, extraction of stone

with strep viridans bacteremia in a patient with Fong esophagitis,

esophagus, and history of sepsis secondary to methicillin-sensitive

Staphylococcus aureus bacteremia, history of aortic valve disease and

status post aortic valve replacement, on Zosyn day #7.  The patient is for

OR today.  The echo showed no vegetation and the repeat blood cultures are

no growth, not consistent with endocarditis.  The patient for OR today.







__________________________________________

Trevor Cruz MD









DD:  09/26/2018 21:16:50

DT:  09/26/2018 21:18:09

Job # [id number]

## 2018-09-26 NOTE — CP.PCM.PN
<Daysi Anderson - Last Filed: 09/26/18 13:41>





Subjective





- Date & Time of Evaluation


Date of Evaluation: 09/26/18


Time of Evaluation: 07:15





- Subjective


Subjective: 





PGY-1 Daysi Anderson D.O. Medicine progress note for Dr. Valdovinos's service:





Patient was seen and examined this morning. He is eager for surgery. Confirmed 

with surgery team that he is on the schedule for 1:30 PM. Patient denies any 

acute complaints. He denies abdominal pain, fever, chills. He denies urinary 

complaints and is having regular BMs.








Objective





- Vital Signs/Intake and Output


Vital Signs (last 24 hours): 


                                        











Temp Pulse Resp BP Pulse Ox


 


 98.1 F   70   20   167/78 H  96 


 


 09/26/18 07:45  09/26/18 07:45  09/26/18 07:45  09/26/18 07:45  09/26/18 07:45








Intake and Output: 


                                        











 09/26/18 09/26/18





 06:59 18:59


 


Intake Total 2820 


 


Balance 2820 














- Medications


Medications: 


                               Current Medications





Atorvastatin Calcium (Lipitor)  20 mg PO DIN [facility]


   Last Admin: 09/25/18 18:15 Dose:  20 mg


Benzonatate (Tessalon Perles)  100 mg PO Q8 [facility]


   Last Admin: 09/26/18 05:30 Dose:  Not Given


Hydralazine HCl (Apresoline)  10 mg IVP Q6 PRN


   PRN Reason: elevated blood pressure


   Last Admin: 09/26/18 05:29 Dose:  10 mg


Piperacillin Sod/Tazobactam Sod (Zosyn 3.375 In Ns 100ml)  100 mls @ 200 mls/hr 

IVPB Q6 [facility]; Protocol


   Stop: 09/27/18 12:01


   Last Admin: 09/26/18 05:30 Dose:  200 mls/hr


Lactated Ringer's (Lactated Ringer's)  1,000 mls @ 100 mls/hr IV .Q10H [facility]


   Last Admin: 09/25/18 21:04 Dose:  100 mls/hr


Metoprolol Tartrate (Lopressor)  25 mg PO BID [facility]


   Last Admin: 09/25/18 18:16 Dose:  25 mg


Ondansetron HCl (Zofran Inj)  4 mg IVP Q6H PRN


   PRN Reason: Nausea/Vomiting


   Last Admin: 09/20/18 07:22 Dose:  4 mg


Pantoprazole Sodium (Protonix Ec Tab)  40 mg PO 0600 [facility]


   Last Admin: 09/26/18 05:30 Dose:  Not Given











- Labs


Labs: 


                                        





                                 09/26/18 06:45 





                                 09/26/18 06:45 





                                        











PT  12.1 SECONDS (9.4-12.5)   09/25/18  07:15    


 


INR  1.05   09/25/18  07:15    


 


APTT  28.3 Seconds (25.1-36.5)   09/25/18  07:15    














- Constitutional


Appears: Non-toxic, No Acute Distress





- Head Exam


Head Exam: ATRAUMATIC, NORMAL INSPECTION





- Eye Exam


Eye Exam: EOMI, Normal appearance





- ENT Exam


ENT Exam: Mucous Membranes Moist, Normal Exam





- Neck Exam


Neck Exam: Normal Inspection





- Respiratory Exam


Respiratory Exam: Clear to Ausculation Bilateral, NORMAL BREATHING PATTERN





- Cardiovascular Exam


Cardiovascular Exam: REGULAR RHYTHM, +S1, +S2, Murmur





- GI/Abdominal Exam


GI & Abdominal Exam: Soft, Normal Bowel Sounds.  absent: Tenderness





- Rectal Exam


Rectal Exam: Deferred





- Extremities Exam


Extremities Exam: Normal Inspection.  absent: Pedal Edema, Tenderness





- Back Exam


Back Exam: NORMAL INSPECTION.  absent: tenderness





- Neurological Exam


Neurological Exam: Alert, Awake, CN II-XII Intact, Normal Gait, Oriented x3


Neuro motor strength exam: Left Upper Extremity: 5, Right Upper Extremity: 5, 

Left Lower Extremity: 5, Right Lower Extremity: 5





- Psychiatric Exam


Psychiatric exam: Normal Affect, Normal Mood





- Skin


Skin Exam: Dry, Intact, Normal Color, Warm





Assessment and Plan





- Assessment and Plan (Free Text)


Assessment: 





Patient is a 78 year old male with a past medical history significant for GERD, 

HTN, HLD, AS s/p bioprosthetic AVR (2009), inguinal hernia s/p LIHR who 

presented with RUQ pain for several days. On imaging, patient was found to have 

gallstones with biliary ductal dilation. MRCP showed a 3mm stone in the CBD. 

Surgery and GI are following the case. Patient was admitted for 

choledocholithiasis and had successful removal of stone via ERCP. Patient lap 

choly is postposed due to 1/2 positive blood cultures. Echo did not show 

vegetations. Rescheduled for today 9/26 at 1:30 PM.





Plan: 





Choledocholithiasis s/p ERCP- lap cholecystecomy tomorrow 9/26


   - Abdominal US (9/19): gallstones with wall edema and no sludge


   - CT abd/pelvis (9/19): mild intrahepatic biliary ductal dilation with 

gallstones


   - MRCP (9/20): 3 mm stone in the CBD. CBD dilated to 6mm. Multiple large 

gallstones seen. 


   - ERCP (9/21): esophageal mucosal changes suspicious for short segment 

Barretts esophagus, biopsies taken at the distal esophagus.    


   Choledocholithiasis was found. Complete removal accomplished by biliary 

sphincterotomy and balloon extraction.


   - Blood Cx 1/2 positive for Strep viridan; likely contamination. 


   - Repeat blood Cx negative >3 days   


   - Echo: EF 57%, no vegetations observed


   - Zosyn 3.375 mg IV Q6H (started 9/20)


   - Zofran 4 mg IV Q6H PRN


   - Surgery consulted (Bonifacio)- lap choly 9/26


   - Cardio consulted (Bryson)- previously cleared, echo does not show vegetations





Transaminitis, improving- likely 2/2 choledocholithiasis


   - AST now wnl, 


   - Alk phos downtrending


   - Repeat labs post-op


   - GI consulted (Mariah)





HTN 


   - Hydralazine 10 mg IVP Q6H PRN 


   - Metoprolol 25 mg PO BID 





GERD


   - Bx from ERCP: focal changes associated with reflux in squamous mucosa and 

reactive changes in glandular epithelium, no intestinal metaplasia


   - Protonix 40 mg PO daily





HLD


   - Lipitor 20 mg PO QHS





AV replacement in 2009


   - ASA 81 mg PO daily- holding pre-op


   - Echo: EF 57%, AV mildly calcified, aortic sclerosis





IVF: LR @ 100


Diet: NPO until post-op


DVT ppx: SCD


GI ppx: Protonix 40 mg PO daily


Code status: full code





Case discussed with attending, Dr. Valdovinos.








<Lazara Valdovinos - Last Filed: 09/27/18 16:19>





Objective





- Vital Signs/Intake and Output


Vital Signs (last 24 hours): 


                                        











Temp Pulse Resp BP Pulse Ox


 


 98.3 F   69   18   134/65   96 


 


 09/27/18 06:00  09/27/18 10:53  09/27/18 06:00  09/27/18 10:53  09/27/18 06:00








Intake and Output: 


                                        











 09/27/18 09/27/18





 06:59 18:59


 


Intake Total 1800 


 


Balance 1800 














- Labs


Labs: 


                                        





                                 09/27/18 07:00 





                                 09/27/18 07:00 





                                        











PT  12.1 SECONDS (9.4-12.5)   09/25/18  07:15    


 


INR  1.05   09/25/18  07:15    


 


APTT  28.3 Seconds (25.1-36.5)   09/25/18  07:15    














Attending/Attestation





- Attestation


I have personally seen and examined this patient.: Yes


I have fully participated in the care of the patient.: Yes


I have reviewed all pertinent clinical information, including history, physical 

exam and plan: Yes


Notes (Text): 


Patient seen and examined by me at 11:15 AM with resident 9/26/18. Case 

including HPI, physical exam, and assessment and plan discussed with resident. 

Agree with above with following additions/corrections.


Patient is a 78-year-old male with past medical history significant for 

hyperlipidemia, GERD, valve replacement 2009, and gallstones that presented to 

emergency room with 3 day history of right-sided abdominal pain.


Patient states he is feeling ok. Feels hungry. Denies any abdominal pain. 

Patient is going for surgery today. No nausea or vomiting. No chest pain or 

shortness of breath. No headaches or dizziness. No fevers or chills. No diarrhea

or constipation. No dysuria. 


Physical exam:


General: Awake and alert sitting up in bed in no acute distress


HEENT: Normocephalic atraumatic. Pupils equal reactive. No scleral icterus. 

Oropharynx is pink and moist. Neck is supple.


Cardiovascular: Normal rhythm. Normal S1, S2. Positive systolic murmur. No rubs 

or gallops appreciated 


Pulmonary: Normal respiratory effort. No rhonchi, rales or wheezing appreciated.




Gastrointestinal: Soft, nondistended. Nontender. Positive bowel sounds all 4 

quadrants, no guarding. 


Musculoskeletal: Moves all extremities, no calf tenderness, no edema appre

ciated. 


Central nervous system: AAOx 3, CN 2-12 grossly intact


Dermatologic:  Skin warm and dry. 


Assessment and plan: Patient is a 78-year-old male with past medical history 

significant for hyperlipidemia, GERD, valve replacement 2009, and gallstones 

that presented to emergency room with 3 day history of right-sided abdominal 

pain.


1.  Choledocholithiasis.  Patient for cholecystectomy today. Status post ERCP 

09/21/2018 with removal of stone with balloon extraction, biliary 

sphincterotomy. ERCP also showed esophageal mucosal changes suspicious for short

segment Fong's esophagus.  CT abdomen and pelvis per radiologist showed 

gallstones, mild intrahepatic ductal dilatation. Gallbladder ultrasound per 

radiology shows liver is mildly enlarged measuring 17.1 cm in length, there is 

diffuse increase in hepatic parenchymal echogenicity consistent with fatty 

infiltration, there are no focal lesions present, there is mild dilatation of 

the intrahepatic biliary ducts; multiple echogenic shadowing gallstones and 

echogenic sludge are noted in the partially distended gallbladder with diffuse 5

mm wall thickening, no sonographic Connelly sign seen; the CBD is mildly dilated 

measuring 7 mm, no discrete CBD stone is seen. MRCP per radiologist shows small 

3 mm stone in the distal common duct, common duct measures 6 mm in diameter, 

multiple large gallstones are seen.  Cardiology following, patient cleared by 

cardiology for surgery.


2. Hypertension. Continue metoprolol. Continue hydralazine every 6 hours as 

needed.


3. Blood culture positive. One blood culture positive for strep viridans. Repeat

blood cultures with no growth. 2d echo per cardiologist shows left ventricle is 

normal size, there is borderline to mild concentric left ventricular 

hypertrophy, left ventricular function is normal, aortic valve is mildly 

calcified, aortic sclerosis, neelima regurgitation is mild, mild tricuspid r

egurgitation, mild pulmonic valvular regurgitation. ID following, 

recommendations appreciated. Continue with Zosyn.


4. Transaminitis. Likely secondary to #1. Downtrending. Continue to monitor.


5. Hyperlipidemia. Continue Lipitor.


6. GERD. Biopsy from EGD showed esophagogastric junction and gastric type 

mucosa, changes associated with reflux and the squamous mucosa and reactive 

changes in the glandular epithelium, no intestinal metaplasia identified, 

special stain for fungus is negative for organisms. Continue Protonix.


7. GI and DVT prophylaxis. Protonix and SCDs with early ambulation


8. Patient is a full code


Case was discussed in detail with the patient regarding current diagnosis and 

treatment plan. All questions answered.

## 2018-09-26 NOTE — PCM.SURG1
Surgeon's Initial Post Op Note





- Surgeon's Notes


Surgeon: Dr. Laureano Valdovinos


Assistant: Dr. Paola Moya PGY2, Mahendra Morrissey MS4


Type of Anesthesia: General Endo, Local


Anesthesia Administered By: Dr. Holley


Pre-Operative Diagnosis: Acute Cholecystitis and Cholelithiasis


Operative Findings: Extensive adhesions around the liver and the gallbladder 

fossa. Friable, inflammed and distended gallbladder with multiple large 

gallstones. Critical view of safety was obtained. Adequate hemostasis obtained 

at the end of the case with electrocautery and surgicel.


Post-Operative Diagnosis: Same


Operation Performed: Laproscopic cholecystomy with extensive lysis of adhesions.


Specimen/Specimens Removed: Gallbladder, multiple [place] large gallstones.


Estimated Blood Loss: EBL {In ML}: 50


Blood Products Given: N/A


Drains Used: No Drains


Date of Surgery/Procedure: 09/26/18


Time of Surgery/Procedure: 13:30

## 2018-09-26 NOTE — PN
DATE:  09/26/2018



SUBJECTIVE:  The patient's cholecystectomy yesterday was postponed.  He

denies any abdominal pain.  He is scheduled for cholecystectomy later this

morning.  He denies any abdominal pain, nausea, vomiting, fevers or chills.



PHYSICAL EXAMINATION:

VITAL SIGNS:  Reveal temperature of 98.1, blood pressure 167/78, heart rate

of 70.

HEENT:  Reveals sclerae to be white.  Conjunctivae pink.

NECK:  Supple.

CHEST:  Lungs are clear.

HEART:  Reveals regular rate and rhythm.

ABDOMEN:  Soft, nontender.

EXTREMITIES:  Show no edema.



LABORATORY DATA:  Reveals white blood cell count 6.4, hemoglobin 12.9. 

Chemistries reveal AST down to normal, ALT down to 134, alkaline

phosphatase down to 184, total bilirubin is normal.



IMPRESSION:  A 78-year-old male with acute cholecystitis.  Blood cultures

positive for Staphylococcus and Streptococcus viridans.  Echocardiogram

negative for vegetations with a common bile duct stone.  He underwent ERCP

with sphincterotomy and stone extraction 5 days ago.  He is awaiting

cholecystectomy.



RECOMMENDATIONS:  The patient is to have a laparoscopic cholecystectomy

this afternoon.





__________________________________________

Kip Lemus MD





DD:  09/26/2018 10:02:28

DT:  09/26/2018 10:03:42

Job # [id number]

## 2018-09-26 NOTE — CP.PCM.PN
Subjective





- Date & Time of Evaluation


Date of Evaluation: 09/26/18


Time of Evaluation: 06:20





- Subjective


Subjective: 





Awake, no distress, for surgery today





Reason for consultation and follow up: Cardiac evaluation and cardiac clearance 

for procedure, admitted for abdominal pain. gallstones, history of aortic valve 

replacement, hyperlipidemia, status post ERCP, for cholecystectomy today.





Seen and examined by me and Dr. Massey








Objective





- Vital Signs/Intake and Output


Vital Signs (last 24 hours): 


                                        











Temp Pulse Resp BP Pulse Ox


 


 98.6 F   70   18   167/78 H  97 


 


 09/25/18 22:37  09/26/18 05:29  09/25/18 22:37  09/26/18 05:29  09/25/18 22:37








Intake and Output: 


                                        











 09/25/18 09/26/18





 18:59 06:59


 


Intake Total  2820


 


Balance  2820














- Medications


Medications: 


                               Current Medications





Atorvastatin Calcium (Lipitor)  20 mg PO DIN [facility]


   Last Admin: 09/25/18 18:15 Dose:  20 mg


Benzonatate (Tessalon Perles)  100 mg PO Q8 [facility]


   Last Admin: 09/26/18 05:30 Dose:  Not Given


Hydralazine HCl (Apresoline)  10 mg IVP Q6 PRN


   PRN Reason: elevated blood pressure


   Last Admin: 09/26/18 05:29 Dose:  10 mg


Piperacillin Sod/Tazobactam Sod (Zosyn 3.375 In Ns 100ml)  100 mls @ 200 mls/hr 

IVPB Q6 [facility]; Protocol


   Stop: 09/27/18 12:01


   Last Admin: 09/26/18 05:30 Dose:  200 mls/hr


Lactated Ringer's (Lactated Ringer's)  1,000 mls @ 100 mls/hr IV .Q10H [facility]


   Last Admin: 09/25/18 21:04 Dose:  100 mls/hr


Metoprolol Tartrate (Lopressor)  25 mg PO BID [facility]


   Last Admin: 09/25/18 18:16 Dose:  25 mg


Ondansetron HCl (Zofran Inj)  4 mg IVP Q6H PRN


   PRN Reason: Nausea/Vomiting


   Last Admin: 09/20/18 07:22 Dose:  4 mg


Pantoprazole Sodium (Protonix Ec Tab)  40 mg PO 0600 [facility]


   Last Admin: 09/26/18 05:30 Dose:  Not Given











- Labs


Labs: 


                                        





                                 09/25/18 07:15 





                                 09/25/18 07:15 





                                        











PT  12.1 SECONDS (9.4-12.5)   09/25/18  07:15    


 


INR  1.05   09/25/18  07:15    


 


APTT  28.3 Seconds (25.1-36.5)   09/25/18  07:15    














- Constitutional


Appears: No Acute Distress





- Eye Exam


Eye Exam: Normal appearance





- ENT Exam


ENT Exam: Mucous Membranes Moist





- Respiratory Exam


Respiratory Exam: Decreased Breath Sounds, Clear to Ausculation Bilateral, 

NORMAL BREATHING PATTERN





- Cardiovascular Exam


Cardiovascular Exam: +S1, +S2





- GI/Abdominal Exam


GI & Abdominal Exam: Soft, Normal Bowel Sounds





- Extremities Exam


Extremities Exam: Normal Capillary Refill





- Neurological Exam


Neurological Exam: Alert, Awake, Oriented x3





- Psychiatric Exam


Psychiatric exam: Normal Affect





- Skin


Skin Exam: Dry, Warm





Assessment and Plan





- Assessment and Plan (Free Text)


Assessment: 





A 78 year old  male who came in to the ER due to abdominal pain. Work up showed 

gallstones. History of aortic valve replacement (2009) hyperlipidemia, 

hypertension,GERD. Status post ERCP and sphincterotomy and stone extraction last

Friday. Moderate to high risk for surgery however no absolute contraindication 

to undergo procedure. No evidence of heart failure or coronary ischemia. Cleared

for surgery from cardiac standpoint. For  cholecystectomy supposedly  Monday 

however postponed due to positive blood culture (1 of 2 blood culture-

(Streptococcus Viridans). repeat blood culture negative. Repeat Echo negative 

for vegetation or endocarditis. Cleared for surgery today. Moderate to high risk

for surgery.

















Plan: 





For cholecystectomy today


Repeat blood cultures done- negative growth after 3 days


Repeat echo negative for vegetations or  endocarditis 


Cleared for surgery from cardiac standpoint (moderate to high risk)


ID on consult


Continue IV antibiotics as ordered by ID


Controlled blood pressure


Heart rate stable


Continue current medications


Continue current treatment





Will follow up postoperatively





Plan and treatment discussed with Dr. Massey

## 2018-09-26 NOTE — PCM.OP
Operative Report





- Operative Report


Date of Surgery/Procedure: 09/26/18


Time of Surgery/Procedure: 14:25


Surgeon: Laureano Valdovinos MD


Assistant: Poala Moya DO (PGY 2 resident)


Anesthesia/Sedation: General endotracheal; 1% lidocaine + 0.25% Marcaine mix 

local anesthesia


Pre-Operative Diagnosis: Acute cholecystitis.  Choledochilithiasis s/p ERCP.  

Obesity


Post-Operative Diagnosis: Acute cholecystitis.  Choledochilithiasis s/p ERCP.  

Obesity


Indication for Surgery: This is a 78-year-old male presented to ED with 

recurrent  RUQ pain due to symptomatic cholelithiasis and acute cholecystitis, 

with elevated liver enzymes concerning for choledocholithiasis. MRCP confirmed 

obstructing stone in distal CBD. Underwent ERCP sphincterotomy and stone 

extraction succesfully.  Details of HPI in clinical chart. Taken to the 

operating room for laparoscopic cholecystectomy. Patient understands the risks 

and benefits of the procedure as documented in the clinic chart but specifically

risk of cystic duct leak and common bile duct injury, need for open surgery and 

has consented to the procedure.


Operative Findings: Significant inflammation in right upper quadrant with 

adhesions gallbladder to omentum; Inflamed dilated, intrahepatic gallbladder 

with multiple large stones.   Clips in place on cystic duct and cystic artery at

end of case without evidence of bleeding or bile leak.  There were significant 

omental adhesions to the anterior abdominal wall and to the falciform ligament, 

Additional adhesions to site of subxiphoid incisions presumable form previous 

drain insertion from prior cardiac surgery.  The left lobe of the liver was 

adherent to the diaphragm and anterior abdominal wall. The right lobe of the 

liver was adherent to the superior right uper quadrant.  Signifant effort was 

taken to meticulously take down omental adhesions described above and omental 

adhesions to the gallbladder fundus; to free up the right liver lobe from 

abdominal wall to allow for appropriate retraction of the gallbladder fundus 

into the upper abdomen. This lysis of adhesions added 45 minutes to the case.


Procedure/Operation Description: PROCEDURES PERFORMED:  1) Laparoscopic 

Cholecystectomy.  2) Extensive lysis of adhesions.  .  DESCRIPTION OF PROCEDURE:

   The patient was given a preoperative dose of Ancef 20 minutes before the 

incision. SCD boots were placed for DVT prophylaxis. The patient had an 

orogastric tube placed in order to empty the stomach after the induction of 

general anesthesia. Upper body warmer placed to maintain normothermia. Secure 

straps placed above and below the knees. The patient had voided immediately 

prior in pre-op holding and No aranda was inserted.  Arms placed on arm boards 

out at 80 degress. All bony prominences were padded.  The abdomen was prepped 

and draped in sterile fashion.  A timeout was performed prior to incision.  All 

skin incisions were made using an 11 blade scalpel after being pre-anesthetized 

with local anesthesia. In the infraaumbilical midline, a circumlinear incision 

was made and the umbilical raphe was identified and the fascia was divided 

between clamps at its base entering the abdomen in an open fashion. A 11-mm 

trocar was inserted in the abdomen and the abdomen was insufflated to 15 mmHg 

pressure with CO2. A 30-degree viewing scope was then inserted and the abdomen 

was generally inspected and there was not found to be any additional signs of 

pathology. In the right upper quadrant, two 5-mm ports were placed after the  

under direct vision.  At this time I prcoeeded to take down the significant 

amount of omental and liver adhesions using a combination of sharp and blunt 

diescction.  See operative findings for details.  Once the adhesions were taken 

down from the anterior abdominal wall, in the subxiphoid midline, an 11-mm 

radially dilating port was placed in the similar fashion.  .  There was 

significant inflammation in the right upper abdomen and a large dilated 

gallbladder fundus was identified beneath the liver edge after omental adhesions

to fundus taken down.   The fundus of the gallbladder was then retracted to the 

right upper quadrant and the neck of the gallbladder was visualized. There was a

large stone at the neck that was inhibiting appropraite retraction of the 

infundibulum. At this point, laparosopci scissors were used to incise the 

gallbladder below the fundus and two large oval shaped stones were removed from 

within the gallbladder. This allow decompression and appropriate retraction of 

the infundibulum.  There were omental adhesions to the gallbladder neck that 

were taken down with a cominbation of sharp dissection and hook cautery. The 

peritoneal attachments from the lateral portion of the gallbladder/cystic duct 

junction were gently dissected and divided to open up the [place]. 

There was significant inflammation at the cystic duct, common bile duct junction

that required meticulous blunt dissection to clearly identify structures. The 

[place] was then dissected up onto the liver bed posterior to the 

gallbladder in order to ensure that this was the cystic duct and not tenting of 

the common bile duct. The peritoneal attachments on the medial portion of the 

gallbladder going up to the side of the liver were taken and distal third of 

galbbladder dissected off the cystic plate. The critical view was obtained. 

Because of the amount of inflammation present, I asked my senior colleaugue Dr. Vasquez to come into the room to confirm, that indeed we had achieved the 

critical view of safety, prior to ligating any structures.  He did agree.  The 

cystic artery and duct were sequentially then doubly clipped and ligated and the

clips were inspected.  .  There was mild bleeding coming from the raw surface of

the liver bed. This was controlled with topical surgical and electrocautery. 

Once this was completed, the gallbladder was dissected free from the liver bed u

sing electrocautery; there was a 2mm biliary radical going into the gallbladder 

body that was clipped and transected. The specimen and the two large stones 

retreived earlier were placed this in an endo catch bag and ithdrawn through the

umbilical port. The abdomen was reinspected. The clips were in good position on 

the cystic artery and duct stumps and the abdomen was generally irrigated and 

drained. The liver bed was hemostatic.  The ports were then removed from the 

abdomen and the abdomen was desufflated with air. The umbilical port was closed 

with interrupted 0- Vicryl suture x3, skin incisions closed with 4-0 Vicryl 

sutures, and finally dermabond applied to skin. The patient tolerated the 

procedure well and was extubated and stable in recovery after the procedure.  . 

I was present throughout the entirety of the procedure. Sponge, needle and 

instrument counts were correct.


Estimated Blood Loss: 50mL


Complications: none


Specimen: gallbladder, gallstones x 2


Discharge & Condition: above

## 2018-09-27 VITALS
OXYGEN SATURATION: 96 % | HEART RATE: 69 BPM | DIASTOLIC BLOOD PRESSURE: 65 MMHG | TEMPERATURE: 98.3 F | SYSTOLIC BLOOD PRESSURE: 134 MMHG

## 2018-09-27 LAB
ALBUMIN SERPL-MCNC: 3.4 G/DL (ref 3–4.8)
ALBUMIN/GLOB SERPL: 1.1 {RATIO} (ref 1.1–1.8)
ALT SERPL-CCNC: 143 U/L (ref 7–56)
AST SERPL-CCNC: 101 U/L (ref 17–59)
BASOPHILS # BLD AUTO: 0.03 K/MM3 (ref 0–2)
BASOPHILS NFR BLD: 0.4 % (ref 0–3)
BUN SERPL-MCNC: 11 MG/DL (ref 7–21)
CALCIUM SERPL-MCNC: 8.8 MG/DL (ref 8.4–10.5)
EOSINOPHIL # BLD: 0.1 10*3/UL (ref 0–0.7)
EOSINOPHIL NFR BLD: 1.4 % (ref 1.5–5)
ERYTHROCYTE [DISTWIDTH] IN BLOOD BY AUTOMATED COUNT: 14.2 % (ref 11.5–14.5)
GFR NON-AFRICAN AMERICAN: > 60
GRANULOCYTES # BLD: 5.26 10*3/UL (ref 1.4–6.5)
GRANULOCYTES NFR BLD: 75 % (ref 50–68)
HGB BLD-MCNC: 11.7 G/DL (ref 14–18)
LYMPHOCYTES # BLD: 0.9 10*3/UL (ref 1.2–3.4)
LYMPHOCYTES NFR BLD AUTO: 12.1 % (ref 22–35)
MCH RBC QN AUTO: 28.2 PG (ref 25–35)
MCHC RBC AUTO-ENTMCNC: 32.3 G/DL (ref 31–37)
MCV RBC AUTO: 87.2 FL (ref 80–105)
MONOCYTES # BLD AUTO: 0.8 10*3/UL (ref 0.1–0.6)
MONOCYTES NFR BLD: 11.1 % (ref 1–6)
PLATELET # BLD: 136 10^3/UL (ref 120–450)
PMV BLD AUTO: 11 FL (ref 7–11)
RBC # BLD AUTO: 4.15 10^6/UL (ref 3.5–6.1)
WBC # BLD AUTO: 7 10^3/UL (ref 4.5–11)

## 2018-09-27 RX ADMIN — PIPERACILLIN AND TAZOBACTAM SCH MLS/HR: 3; .375 INJECTION, POWDER, LYOPHILIZED, FOR SOLUTION INTRAVENOUS; PARENTERAL at 00:02

## 2018-09-27 RX ADMIN — PANTOPRAZOLE SODIUM SCH MG: 40 TABLET, DELAYED RELEASE ORAL at 05:48

## 2018-09-27 NOTE — CP.PCM.DIS
Provider





- Provider


Date of Admission: 


09/20/18 01:46





Attending physician: 


Lazara Valdovinos DO





Primary care physician: 





Dr. Quick





Consults: 





GI


surgery


cardiology


ID





Time Spent in preparation of Discharge (in minutes): 45





Diagnosis





- Discharge Diagnosis


(1) Choledocholithiasis with acute cholecystitis


Status: Resolved   Priority: High   





(2) Elevated transaminase level


Status: Acute   Priority: Medium   





(3) HTN (hypertension)


Status: Chronic   Priority: Medium   





(4) GERD (gastroesophageal reflux disease)


Status: Chronic   Priority: Low   





(5) HLD (hyperlipidemia)


Status: Chronic   Priority: Low   





Hospital Course





- Lab Results


Lab Results: 


                                  Micro Results





09/22/18 06:30   Blood   Blood Culture - Final


                            NO GROWTH AFTER 5 DAYS


09/22/18 06:30   Blood   Gram Stain - Final


                            TEST NOT PERFORMED


09/20/18 07:00   Blood   Blood Culture - Final


                            NO GROWTH AFTER 5 DAYS


09/20/18 07:00   Blood   Gram Stain - Final


                            TEST NOT PERFORMED


09/20/18 01:56   Blood   S.aureus & Coag-Neg Staph PNA FISH - Final


09/20/18 01:56   Blood   Blood Culture - Final


                            Streptococcus Viridans


09/20/18 01:56   Blood   Gram Stain - Final





                             Most Recent Lab Values











WBC  7.0 10^3/ul (4.5-11.0)   09/27/18  07:00    


 


RBC  4.15 10^6/uL (3.5-6.1)   09/27/18  07:00    


 


Hgb  11.7 g/dL (14.0-18.0)  L  09/27/18  07:00    


 


Hct  36.2 % (42.0-52.0)  L  09/27/18  07:00    


 


MCV  87.2 fl (80.0-105.0)   09/27/18  07:00    


 


MCH  28.2 pg (25.0-35.0)   09/27/18  07:00    


 


MCHC  32.3 g/dl (31.0-37.0)   09/27/18  07:00    


 


RDW  14.2 % (11.5-14.5)   09/27/18  07:00    


 


Plt Count  136 10^3/uL (120.0-450.0)   09/27/18  07:00    


 


MPV  11.0 fl (7.0-11.0)   09/27/18  07:00    


 


Gran %  75.0 % (50.0-68.0)  H  09/27/18  07:00    


 


Lymph % (Auto)  12.1 % (22.0-35.0)  L  09/27/18  07:00    


 


Mono % (Auto)  11.1 % (1.0-6.0)  H  09/27/18  07:00    


 


Eos % (Auto)  1.4 % (1.5-5.0)  L  09/27/18  07:00    


 


Baso % (Auto)  0.4 % (0.0-3.0)   09/27/18  07:00    


 


Gran #  5.26  (1.4-6.5)   09/27/18  07:00    


 


Lymph # (Auto)  0.9  (1.2-3.4)  L  09/27/18  07:00    


 


Mono # (Auto)  0.8  (0.1-0.6)  H  09/27/18  07:00    


 


Eos # (Auto)  0.1  (0.0-0.7)   09/27/18  07:00    


 


Baso # (Auto)  0.03 K/mm3 (0.0-2.0)   09/27/18  07:00    


 


PT  12.1 SECONDS (9.4-12.5)   09/25/18  07:15    


 


INR  1.05   09/25/18  07:15    


 


APTT  28.3 Seconds (25.1-36.5)   09/25/18  07:15    


 


Sodium  140 mmol/L (132-148)   09/27/18  07:00    


 


Potassium  3.8 mmol/L (3.6-5.0)   09/27/18  07:00    


 


Chloride  104 mmol/L ()   09/27/18  07:00    


 


Carbon Dioxide  30 mmol/L (21-33)   09/27/18  07:00    


 


Anion Gap  9  (10-20)  L  09/27/18  07:00    


 


BUN  11 mg/dL (7-21)   09/27/18  07:00    


 


Creatinine  0.8 mg/dl (0.8-1.5)   09/27/18  07:00    


 


Est GFR ( Amer)  > 60   09/27/18  07:00    


 


Est GFR (Non-Af Amer)  > 60   09/27/18  07:00    


 


Random Glucose  92 mg/dL ()   09/27/18  07:00    


 


Calcium  8.8 mg/dL (8.4-10.5)   09/27/18  07:00    


 


Phosphorus  3.9 mg/dL (2.5-4.5)   09/27/18  07:00    


 


Magnesium  1.8 mg/dL (1.7-2.2)   09/27/18  07:00    


 


Total Bilirubin  1.1 mg/dL (0.2-1.3)   09/27/18  07:00    


 


Direct Bilirubin  2.4 mg/dL (0.0-0.4)  H  09/20/18  07:00    


 


GGT  1146 U/L (8-78)  H  09/20/18  07:00    


 


AST  101 U/L (17-59)  H D 09/27/18  07:00    


 


ALT  143 U/L (7-56)  H  09/27/18  07:00    


 


Alkaline Phosphatase  149 U/L ()  H  09/27/18  07:00    


 


Lactate Dehydrogenase  1652 U/L (333-699)  H  09/19/18  22:00    


 


Total Creatine Kinase  55 U/L ()   09/19/18  22:00    


 


Troponin I  < 0.01 ng/mL D 09/19/18  22:00    


 


Total Protein  6.5 g/dL (5.8-8.3)   09/27/18  07:00    


 


Albumin  3.4 g/dL (3.0-4.8)   09/27/18  07:00    


 


Globulin  3.1 gm/dL  09/27/18  07:00    


 


Albumin/Globulin Ratio  1.1  (1.1-1.8)   09/27/18  07:00    


 


Lipase  155 U/L ()   09/22/18  07:30    


 


Urine Color  Yellow  (YELLOW)   09/20/18  03:00    


 


Urine Appearance  Cloudy  (CLEAR)   09/20/18  03:00    


 


Urine pH  7.5  (4.7-8.0)   09/20/18  03:00    


 


Ur Specific Gravity  1.010  (1.005-1.035)   09/20/18  03:00    


 


Urine Protein  Trace mg/dL (<30 mg/dL)  H  09/20/18  03:00    


 


Urine Glucose (UA)  Negative mg/dL (NEGATIVE)   09/20/18  03:00    


 


Urine Ketones  Negative mg/dL (NEGATIVE)   09/20/18  03:00    


 


Urine Blood  Trace-intact  (NEGATIVE)  H  09/20/18  03:00    


 


Urine Nitrate  Negative  (NEGATIVE)   09/20/18  03:00    


 


Urine Bilirubin  Small  (NEGATIVE)  H  09/20/18  03:00    


 


Urine Urobilinogen  1.0 E.U./dL (<1 E.U./dL)  H  09/20/18  03:00    


 


Ur Leukocyte Esterase  Negative Bronson/uL (NEGATIVE)   09/20/18  03:00    


 


Urine RBC  0 - 2 /hpf (0-2)   09/20/18  03:00    


 


Urine WBC  0 - 2 /hpf (0-6)   09/20/18  03:00    


 


Ur Epithelial Cells  0 - 2 /hpf (0-5)   09/20/18  03:00    


 


Hepatitis A IgM Ab  Negative  (NEGATIVE)   09/20/18  07:00    


 


Hep Bs Antigen  Negative  (NEGATIVE)   09/20/18  07:00    


 


Hep B Core IgM Ab  Negative  (NEGATIVE)   09/20/18  07:00    


 


Hepatitis C Antibody  Negative  (NEGATIVE)   09/20/18  07:00    


 


Blood Type  O POSITIVE   09/20/18  07:00    


 


Blood Type Confirm  O POSITIVE   09/20/18  08:30    


 


Antibody Screen  Negative   09/20/18  07:00    


 


BBK History Checked  No verified bt   09/20/18  07:00    














- Hospital Course


Hospital Course: 





Patient is a 78 year old male with a past medical history significant for GERD, 

HTN, HLD, AS s/p bioprosthetic AVR (2009), inguinal hernia s/p LI[facility] who 

presented on 9/19/18 with RUQ pain for 1 week. Pain was worse after eating and 

was associated with nausea.





Upon initial exam, patient had RUQ tenderness and mild abdominal distension. 

Labs revealed transaminitis. On imaging, patient was found to have gallstones 

with biliary ductal dilation. MRCP showed 3cm distal CBD stone within the CBD 

measuring 6mm as well as multiple large gallstones. Patient was admitted for 

choledocholithiasis and had successful removal of stone via ERCP on 9/21/18. 

Patient was treated with IV zosyn and vancomycin. Laparoscopic cholecystectomy 

was postponed due to positive blood cultures on 9/20/18. Echo was done and did 

not demonstrate any vegetations. Repeat blood cultures were negative on 9/22/18.

Laparoscopic cholecystectomy with extensive lysis of lesions was performed on 

9/26/18. There were no complications during surgery.





On morning of discharge, patient was feeling well. He was afebrile, ambulating, 

voiding freely, and passing gas. He denied nausea and vomiting. He has mild 

tenderness across incision sites. Incision sites were clean with no drainage. 

AST, ALT, and alkaline phosphatase have been downtrending since admission. 

Patient was instructed to use the inspiratory spirometer daily and complete a 

short course of abx.








Discharge Exam





- Head Exam


Head Exam: ATRAUMATIC, NORMOCEPHALIC





- Eye Exam


Eye Exam: EOMI, Normal appearance





- ENT Exam


ENT Exam: Mucous Membranes Moist, Normal Exam





- Neck Exam


Neck exam: Full Rom, Normal Inspection





- Respiratory Exam


Respiratory Exam: Clear to PA & Lateral, NORMAL BREATHING PATTERN, UNREMARKABLE





- Cardiovascular Exam


Cardiovascular Exam: REGULAR RHYTHM, +S1, +S2





- GI/Abdominal Exam


GI & Abdominal Exam: Normal Bowel Sounds, Soft, Tenderness (mild)


Additional comments: 





small incision sites from lap surgery intact with glue, no signs of infection








- Rectal Exam


Rectal Exam: Deferred





- Extremities Exam


Extremities exam: normal inspection, pedal pulses present





- Back Exam


Back exam: NORMAL INSPECTION





- Neurological Exam


Neurological exam: Alert, CN II-XII Intact, Normal Gait, Oriented x3





- Psychiatric Exam


Psychiatric exam: Normal Affect, Normal Mood





- Skin


Skin Exam: Dry, Intact, Normal Color, Warm





Discharge Plan





- Discharge Medications


Prescriptions: 


Amoxicillin/Clavulanate [Augmentin 875 MG-125 MG] 1 tab PO BID 3 Days  tab


Metoprolol Tartrate [Lopressor] 25 mg PO BID 14 Days  tab


oxyCODONE/Acetaminophen [Percocet 5/325 mg Tab] 1 ea PO Q6 PRN #12 tab


 PRN Reason: Pain, Severe (8-10)





- Follow Up Plan


Condition: IMPROVED


Disposition: HOME/ ROUTINE


Patient education suggested?: Yes


Instructions:  Low Cholesterol, Saturated Fat, and Trans Fat Diet , 

Cholecystitis (DC), Cholecystitis (GEN)


Additional Instructions: 


You are being discharged after getting a laparoscopic cholecystectomy on 

9/26/18.





Please follow-up with your primary care provider, Dr. Quick, within 3-5 days of 

discharge.


Please follow-up with surgeon, Dr. Valdovinos, within 2-3 weeks of discharge. Keep 

the incisions clean and dry. No heavy lifting. If significant bruising and/or 

bleeding, severe nausea or vomiting, fever >100.4F not relieved by Tylenol, or 

any other concerning symptoms please contact Dr. Valdovinos immediately or come to 

the ER.





You will be given a prescription for Augmentin (antibiotic) for 3 days. Take 

until all pills completed.





You may restart your home aspirin 81 mg tomorrow 9/28/18.





Referrals: 


Laureano Valdovinos MD [Staff Provider] -

## 2018-09-27 NOTE — PN
DATE:  09/27/2018



SUBJECTIVE:  The patient is in bed, in no acute distress, nontoxic.



PHYSICAL EXAMINATION

VITAL SIGNS:  Temperature is 98, blood pressure 130/60, respiratory rate of

18.

HEENT:  Unremarkable.

NECK:  Supple.

LUNGS:  Have decreased breath sounds.

HEART:  Normal S1, S2.

ABDOMEN:  Soft, nontender.



LABORATORY DATA:  Reveals a white count of 7000, hemoglobin 11, platelets

are 136.  Chemistries reveal BUN of 11, creatinine of 0.8.  LFTs are

elevated.  Urinalysis is noted.  Serology is negative.  The patient did

have strep viridans in one bottle.



ASSESSMENT AND PLAN:  This is a 78-year-old male who is admitted with

cholelithiasis, acute cholecystitis, choledocholithiasis, status post

endoscopic retrograde cholangiopancreatography, sphincterotomy, extraction

of stone with Streptococcus viridans bacteremia in one bottle in a patient

with Fong esophagitis and history of sepsis secondary to

methicillin-sensitive Staphylococcus aureus bacteremia, history of aortic

valve replacement and is receiving Zosyn, had an echo which was negative. 

Blood cultures, only one bottle and patient had surgery, tolerated well. 

The patient will be discharged today on p.o. antibiotics and discussed with

the house staff that taking care of the patient.





__________________________________________

Trevor Cruz MD



DD:  09/27/2018 18:43:25

DT:  09/27/2018 20:44:06

Job # [id number]

## 2018-09-27 NOTE — PN
DATE:  09/27/2018



SUBJECTIVE:   The patient is lying in bed, comfortable.  He denies minimal

postop pain.  He is postop day #1 of laparoscopic cholecystectomy.  He is

tolerating liquid diet.



PHYSICAL EXAMINATION:

Vital signs:  Reveal temperature of 98.3, blood pressure 134/65, heart rate

69.

HEENT:  Reveal sclerae to be white.  Conjunctivae pink.

NECK:  Supple.

CHEST:  Lungs are clear.

HEART:  Reveals regular rate and rhythm.

ABDOMEN:  Soft, nontender.  There are multiple clean laparoscopic puncture

wounds without drainage.   No Mane drains present.

EXTREMITIES:  Show no edema.



LABORATORY DATA:  Reveal white blood cell count 7, hemoglobin 11.7. 

Chemistries reveal , , alkaline phosphatase of 149.  These

are trending downward.  Total bilirubin is 1.1.



IMPRESSION:  A 78-year-old male with acute cholecystitis, common bile duct

stone.  Strep viridans sepsis, status post laparoscopic cholecystectomy. 

He is currently stable.



RECOMMENDATIONS:  The patient can be discharged as per recommendations by

surgical team.







__________________________________________

Kip Lemus MD







DD:  09/27/2018 9:37:56

DT:  09/27/2018 9:39:24

Job # [id number]

## 2018-09-27 NOTE — CP.PCM.PN
Subjective





- Date & Time of Evaluation


Date of Evaluation: 09/27/18


Time of Evaluation: 06:25





- Subjective


Subjective: 








Awake, no distress, denies shortness of breath, discomfort on abdomen





Reason for consultation and follow up: Cardiac evaluation and cardiac clearance 

for procedure, admitted for abdominal pain. gallstones, history of aortic valve 

replacement, hyperlipidemia, status post ERCP, status post  cholecystectomy 





Seen and examined by me and Dr. Massey





Objective





- Vital Signs/Intake and Output


Vital Signs (last 24 hours): 


                                        











Temp Pulse Resp BP Pulse Ox


 


 98.8 F   73   18   131/72   99 


 


 09/26/18 22:00  09/26/18 22:00  09/26/18 22:00  09/26/18 22:00  09/26/18 22:00








Intake and Output: 


                                        











 09/27/18 09/27/18





 06:59 18:59


 


Intake Total 300 


 


Balance 300 














- Medications


Medications: 


                               Current Medications





Acetaminophen (Tylenol 325mg Tab)  650 mg PO Q4H PRN


   PRN Reason: Fever >100.4 F


Atorvastatin Calcium (Lipitor)  20 mg PO DIN [facility]


   Last Admin: 09/26/18 18:58 Dose:  20 mg


Benzonatate (Tessalon Perles)  100 mg PO Q8 [facility]


   Last Admin: 09/27/18 05:48 Dose:  100 mg


Hydralazine HCl (Apresoline)  10 mg IVP Q6 PRN


   PRN Reason: elevated blood pressure


   Last Admin: 09/26/18 05:29 Dose:  10 mg


Hydromorphone HCl (Dilaudid)  0.5 mg IVP Q15M PRN


   PRN Reason: Other


   Last Admin: 09/26/18 17:37 Dose:  0.5 mg


Piperacillin Sod/Tazobactam Sod (Zosyn 3.375 In Ns 100ml)  100 mls @ 200 mls/hr 

IVPB Q6 [facility]; Protocol


   Stop: 09/28/18 12:01


   Last Admin: 09/27/18 00:02 Dose:  200 mls/hr


Lactated Ringer's (Lactated Ringer's)  1,000 mls @ 125 mls/hr IV .Q8H [facility]


   Last Admin: 09/27/18 00:01 Dose:  125 mls/hr


Metoprolol Tartrate (Lopressor)  25 mg PO BID [facility]


   Last Admin: 09/26/18 18:57 Dose:  25 mg


Morphine Sulfate (Morphine)  4 mg IVP Q4H PRN


   PRN Reason: Pain, severe (8-10)


Ondansetron HCl (Zofran Inj)  4 mg IVP Q6H PRN


   PRN Reason: Nausea/Vomiting


   Last Admin: 09/26/18 17:40 Dose:  4 mg


Oxycodone HCl (Oxycodone Immediate Release Tab)  5 mg PO Q6H PRN


   PRN Reason: Pain, moderate (4-7)


Pantoprazole Sodium (Protonix Ec Tab)  40 mg PO 0600 [facility]


   Last Admin: 09/27/18 05:48 Dose:  40 mg











- Labs


Labs: 


                                        





                                 09/26/18 06:45 





                                 09/26/18 06:45 





                                        











PT  12.1 SECONDS (9.4-12.5)   09/25/18  07:15    


 


INR  1.05   09/25/18  07:15    


 


APTT  28.3 Seconds (25.1-36.5)   09/25/18  07:15    














- Constitutional


Appears: No Acute Distress





- Eye Exam


Eye Exam: Normal appearance





- ENT Exam


ENT Exam: Mucous Membranes Dry





- Respiratory Exam


Respiratory Exam: Clear to Ausculation Bilateral, NORMAL BREATHING PATTERN





- Cardiovascular Exam


Cardiovascular Exam: +S1, +S2





- GI/Abdominal Exam


GI & Abdominal Exam: Soft, Normal Bowel Sounds


Additional comments: 





laparoscopic sites no bleeding





- Extremities Exam


Extremities Exam: Normal Capillary Refill





- Neurological Exam


Neurological Exam: Alert, Awake, Oriented x3





- Psychiatric Exam


Psychiatric exam: Normal Affect





- Skin


Skin Exam: Dry, Warm





Assessment and Plan





- Assessment and Plan (Free Text)


Assessment: 





A 78 year old  male who came in to the ER due to abdominal pain. Work up showed 

gallstones. History of aortic valve replacement (2009) hyperlipidemia, 

hypertension,GERD. Status post ERCP and sphincterotomy and stone extraction last

Friday. Moderate to high risk for surgery however no absolute contraindication 

to undergo procedure. No evidence of heart failure or coronary ischemia. Cleared

for surgery from cardiac standpoint. For  cholecystectomy supposedly  Monday 

however postponed due to positive blood culture (1 of 2 blood culture-

(Streptococcus Viridans). repeat blood culture negative. Repeat Echo negative 

for vegetation or endocarditis. Cleared for surgery .Moderate to high risk for 

surgery.Status post laparoscopic cholecystectomy.




















Plan: 





Status post  cholecystectomy yesterday POD#1


Stable, no distress, afebrile


Controlled blood pressure


Heart rate stable


Cardiac status stable


On regular diet


On Lipitor 20 mg daily, Apresoline 10 mg IV PRN, Lopressor 25 mg BID,


   Zosyn 3.375 mg IV every 6 hours


Continue IV antibiotics per ID


Continue current medications


Continue current treatment





Will follow up 


Plan and treatment discussed with Dr. Massey

## 2018-09-27 NOTE — CP.PCM.PN
Subjective





- Date & Time of Evaluation


Date of Evaluation: 09/27/18


Time of Evaluation: 07:00





- Subjective


Subjective: 


General surgery progress note for Dr. Valdovinos





Pt seen and examined at bedside this AM. No adverse events overnight. Patient 

tolerated clear liquid diet with no nausea, vomiting, or fevers, states that 

pain is well controlled on current regimen and he is voiding freely.





Objective





- Vital Signs/Intake and Output


Vital Signs (last 24 hours): 


                                        











Temp Pulse Resp BP Pulse Ox


 


 98.8 F   73   18   131/72   99 


 


 09/26/18 22:00  09/26/18 22:00  09/26/18 22:00  09/26/18 22:00  09/26/18 22:00








Intake and Output: 


                                        











 09/27/18 09/27/18





 06:59 18:59


 


Intake Total 1800 


 


Balance 1800 














- Medications


Medications: 


                               Current Medications





Acetaminophen (Tylenol 325mg Tab)  650 mg PO Q4H PRN


   PRN Reason: Fever >100.4 F


Atorvastatin Calcium (Lipitor)  [facility] PO DIN [facility]


   Last Admin: 09/26/18 18:58 Dose:  20 mg


Benzonatate (Tessalon Perles)  100 mg PO Q8 [facility]


   Last Admin: 09/27/18 05:48 Dose:  100 mg


Hydralazine HCl (Apresoline)  10 mg IVP Q6 PRN


   PRN Reason: elevated blood pressure


   Last Admin: 09/26/18 05:29 Dose:  10 mg


Piperacillin Sod/Tazobactam Sod (Zosyn 3.375 In Ns 100ml)  100 mls @ 200 mls/hr 

IVPB Q6 [facility]; Protocol


   Stop: 09/28/18 12:01


   Last Admin: 09/27/18 00:02 Dose:  200 mls/hr


Metoprolol Tartrate (Lopressor)  25 mg PO BID [facility]


   Last Admin: 09/26/18 18:57 Dose:  25 mg


Ondansetron HCl (Zofran Inj)  4 mg IVP Q6H PRN


   PRN Reason: Nausea/Vomiting


   Last Admin: 09/26/18 17:40 Dose:  4 mg


Oxycodone HCl (Oxycodone Immediate Release Tab)  5 mg PO Q6H PRN


   PRN Reason: Pain, moderate (4-7)


Pantoprazole Sodium (Protonix Ec Tab)  40 mg PO 0600 [facility]


   Last Admin: 09/27/18 05:48 Dose:  40 mg











- Labs


Labs: 


                                        





                                 09/27/18 07:00 





                                 09/27/18 07:00 





                                        











PT  12.1 SECONDS (9.4-12.5)   09/25/18  07:15    


 


INR  1.05   09/25/18  07:15    


 


APTT  28.3 Seconds (25.1-36.5)   09/25/18  07:15    














- Constitutional


Appears: Well, Non-toxic, No Acute Distress





- Head Exam


Head Exam: ATRAUMATIC, NORMOCEPHALIC





- Eye Exam


Eye Exam: Normal appearance.  absent: Conjunctival injection, Scleral icterus





- ENT Exam


ENT Exam: Mucous Membranes Moist, Normal Oropharynx





- Respiratory Exam


Respiratory Exam: NORMAL BREATHING PATTERN.  absent: Accessory Muscle Use, 

Respiratory Distress





- Cardiovascular Exam


Cardiovascular Exam: RRR





- GI/Abdominal Exam


GI & Abdominal Exam: Soft, Tenderness (RUQ and crystal-incisional tenderness 

appropriate to POD#1).  absent: Distended


Additional comments: 





incisions well approximated with dermabond, no drainage or bleeding, mild 

erythema around the umbilical incision





- Extremities Exam


Extremities Exam: absent: Calf Tenderness, Pedal Edema, Tenderness





- Neurological Exam


Neurological Exam: Alert, Awake, Oriented x3





- Psychiatric Exam


Psychiatric exam: Normal Affect, Normal Mood





- Skin


Skin Exam: Dry, Normal Color, Warm





Assessment and Plan





- Assessment and Plan (Free Text)


Assessment: 





78M POD#1 s/p laparoscopic cholecystectomy


Plan: 





-Replete K and magnesium


-Advance to regular diet


-Continue antibiotics for 23 hours post op


-PRN nausea medication


-PRN PO pain medication


-Encourage ambulation and incentive spirometer use


-Clear for D/C to home today with PO percocet and pericolace if tolerating 

regular diet. Follow up with Dr. Valdovinos in his [place] office in 2-3 weeks.





Discussed with Dr. Valdovinos, who agrees with above


Paola Moya, PGY2

## 2018-09-27 NOTE — PN
DATE:  09/26/2018



This note is an addendum to initial progress note dictated this morning by

CLEM Prado



REASON FOR THE CONSULTATION:  Preop evaluation and risk stratification.



RECOMMENDATIONS:  The patient is getting IV fluids, lactated Ringers 100 mL

an hour.  We will cut down the fluid to 50 mL an hour.  The patient is

cleared from Cardiology point of view regarding the moderate risk because

of underlying comorbidity.  Repeat echo shows no vegetation, no evidence of

endocarditis.  Repeat blood cultures are also negative for sepsis and as

mentioned, echo was negative for endocarditis, no evidence of endocarditis.

The patient is cleared to go for surgery with moderate risk and we will

decrease IV fluid to 50 mL an hour because the patient is getting 100 mL an

hour and _____ swelling of the leg postop.  We will manage fluid status and

if needed, we will give p.r.n. diuretics and monitor electrolytes.  We will

follow with you.  We will give one dose of KCl IV and repeat the lab in the

morning.



Thank you Dr. Valdovinos for providing us the opportunity in taking care of the

patient, Yaniv.







__________________________________________

Kane Massey MD





DD:  09/26/2018 9:48:11

DT:  09/26/2018 10:55:25

Job # [id number]

## 2018-09-28 NOTE — PN
DATE:  09/27/2018



REASON FOR CONSULTATION AND FOLLOWUP:  Preop evaluation status post

followup.



This note is an addendum to the initial progress note dictated this morning

by nurse practitioner.



The patient underwent _____ remained stable and postop followup, the

patient remained stable.  Plan is to increase a diet as tolerated and

resume back the metoprolol.  We will follow with you.



Thank you, Dr. Valdovinos, for providing us the opportunity in taking care of

the patient, Yaniv Goldman.





__________________________________________

Kane Massey MD



DD:  09/27/2018 15:47:27

DT:  09/27/2018 16:40:15

Job # [id number]
